# Patient Record
Sex: FEMALE | Race: AMERICAN INDIAN OR ALASKA NATIVE | URBAN - METROPOLITAN AREA
[De-identification: names, ages, dates, MRNs, and addresses within clinical notes are randomized per-mention and may not be internally consistent; named-entity substitution may affect disease eponyms.]

---

## 2017-10-03 ENCOUNTER — ALLIED HEALTH/NURSE VISIT (OUTPATIENT)
Dept: NEUROLOGY | Facility: CLINIC | Age: 22
End: 2017-10-03

## 2017-10-03 ENCOUNTER — OFFICE VISIT (OUTPATIENT)
Dept: NEUROLOGY | Facility: CLINIC | Age: 22
End: 2017-10-03

## 2017-10-03 ENCOUNTER — HOSPITAL ENCOUNTER (OUTPATIENT)
Facility: CLINIC | Age: 22
End: 2017-10-03
Attending: PSYCHIATRY & NEUROLOGY | Admitting: PSYCHIATRY & NEUROLOGY
Payer: MEDICARE

## 2017-10-03 VITALS — HEIGHT: 64 IN | BODY MASS INDEX: 17.31 KG/M2 | WEIGHT: 101.4 LBS

## 2017-10-03 DIAGNOSIS — G40.909 SEIZURE DISORDER (H): Primary | ICD-10-CM

## 2017-10-03 DIAGNOSIS — G40.812 LENNOX-GASTAUT SYNDROME WITH TONIC SEIZURES (H): Primary | ICD-10-CM

## 2017-10-03 NOTE — MR AVS SNAPSHOT
After Visit Summary   10/3/2017    Marilyn Cyr    MRN: [id number]           Patient Information     Date Of Birth          1995        Visit Information        Provider Department      10/3/2017 9:30 AM [facility]        Today's Diagnoses     Seizure disorder (H)    -  1       Follow-ups after your visit        Your next 10 appointments already scheduled     Oct 03, 2017  1:30 PM CDT   New Patient Visit with Ryland López MD   [facility] ([facility])    [address], [address]  [facility] [id number] [phone]              Who to contact     Please call your clinic at [phone] to:    Ask questions about your health    Make or cancel appointments    Discuss your medicines    Learn about your test results    Speak to your doctor   If you have compliments or concerns about an experience at your clinic, or if you wish to file a complaint, please contact [facility] Patient Relations at [phone] or email us at [email or web address]         Additional Information About Your Visit        MyChart Information     [employer] is an electronic gateway that provides easy, online access to your medical records. With [employer], you can request a clinic appointment, read your test results, renew a prescription or communicate with your care team.     To sign up for [employer] visit the website at [email or web address]   You will be asked to enter the access code listed below, as well as some personal information. Please follow the directions to create your username and password.     Your access code is: [email or web address]  Expires: 2018 12:31 PM     Your access code will  in 90 days. If you need help or a new code, please contact your [facility] or call [phone] for assistance.        Care EveryWhere ID     This is your Care EveryWhere ID. This could be used  by other organizations to access your [place] medical records  [id number]         Blood Pressure from Last 3 Encounters:   No data found for BP    Weight from Last 3 Encounters:   No data found for Wt              We Performed the Following     CHARGE: Video EEG  < 12 hours (49734-67)     ORDER:  EEG video monitoring        Primary Care Provider Office Phone # Fax #    Philippe Moon [phone]       [address] [address]        Equal Access to Services     WALKER SIBLEY : Hadii aad ku hadasho Soomaali, waaxda luqadaha, qaybta kaalmada adeegyada, waxay idiin hayaan adeeg kharajaden laharsh . So [facility] [phone].    ATENCIÓN: Si habla español, tiene a ingram disposición servicios gratuitos de asistencia lingüística. Llame al [phone].    We comply with applicable federal civil rights laws and [place] laws. We do not discriminate on the basis of race, color, national origin, age, disability, sex, sexual orientation, or gender identity.            Thank you!     Thank you for choosing [facility]  for your care. Our goal is always to provide you with excellent care. Hearing back from our patients is one way we can continue to improve our services. Please take a few minutes to complete the written survey that you may receive in the mail after your visit with us. Thank you!             Your Updated Medication List - Protect others around you: Learn how to safely use, store and throw away your medicines at www.disposemymeds.org.      Notice  As of 10/3/2017  1:04 PM    You have not been prescribed any medications.

## 2017-10-03 NOTE — MR AVS SNAPSHOT
After Visit Summary   10/3/2017    Marilyn Cyr    MRN: [id number]           Patient Information     Date Of Birth          1995        Visit Information        Provider Department      10/3/2017 1:30 PM Ryland López MD [facility]        Today's Diagnoses     Lennox-Gastaut syndrome with tonic seizures (H)    -  1       Follow-ups after your visit        Your next 10 appointments already scheduled     Oct 30, 2017  4:00 PM CDT   Telephone Call with [facility] Nurse 2   [facility] ([facility])    [address], [address]  [facility] [id number] [phone]           Note: this is not an onsite visit; there is no need to come to the facility.            Nov 01, 2017 10:00 AM CDT   (Arrive by 9:15 AM)   Video Hookup Visit with [facility] EEG TECH 4   [facility] EEG ([facility])    [facility]  [address] [facility] [address] [phone]           [place]: Your appointment is scheduled at [facility]. [address] [place] [address]              Who to contact     Please call your clinic at [phone] to:    Ask questions about your health    Make or cancel appointments    Discuss your medicines    Learn about your test results    Speak to your doctor   If you have compliments or concerns about an experience at your clinic, or if you wish to file a complaint, please contact [facility] Patient Relations at [phone] or email us at [email or web address]         Additional Information About Your Visit        ESKYhart Information     "[employer]" is an electronic gateway that provides easy, online access to your medical records. With "[employer]", you can request a clinic appointment, read your test results, renew a prescription or communicate with your care team.     To sign up for "[employer]" visit the website at  "[email or web address]   You will be asked to enter the access code listed below, as well as some personal information. Please follow the directions to create your username and password.     Your access code is: [email or web address]  Expires: 2018 12:31 PM     Your access code will  in 90 days. If you need help or a new code, please contact your [facility] or call [phone] for assistance.        Care EveryWhere ID     This is your Care EveryWhere ID. This could be used by other organizations to access your [place] medical records  [id number]        Your Vitals Were     Height BMI (Body Mass Index)                5' 4\" (162.6 cm) 17.41 kg/m2           Blood Pressure from Last 3 Encounters:   No data found for BP    Weight from Last 3 Encounters:   10/03/17 101 lb 6.4 oz (46 kg)              We Performed the Following     Admit to Inpatient        Primary Care Provider Office Phone # Fax #    Philippe Moon [phone]       [address] [address]        Equal Access to Services     [facility]: Hadii aad ku hadasho Soomaali, waaxda luqadaha, qaybta kaalmada adeegyada, waxay idiin hayaan adejaqueline greenfield . So [facility] [phone].    ATENCIÓN: Si habla español, tiene a ingram disposición servicios gratuitos de asistencia lingüística. Llame al [phone].    We comply with applicable federal civil rights laws and [place] laws. We do not discriminate on the basis of race, color, national origin, age, disability, sex, sexual orientation, or gender identity.            Thank you!     Thank you for choosing [facility] EPILEPSY [facility]  for your care. Our goal is always to provide you with excellent care. Hearing back from our patients is one way we can continue to improve our services. Please take a few minutes to complete the written survey that you may receive in the mail after your visit with us. Thank you!           "   Your Updated Medication List - Protect others around you: Learn how to safely use, store and throw away your medicines at www.disposemymeds.org.          This list is accurate as of: 10/3/17  2:46 PM.  Always use your most recent med list.                   Brand Name Dispense Instructions for use Diagnosis    LAMOTRIGINE PO      Take 150 mg by mouth 2 times daily

## 2017-10-03 NOTE — LETTER
10/3/2017       RE: Marilyn Cyr  : 1995   MRN: [id number]      Dear Colleague,    Thank you for referring your patient, Marilyn Cyr, to the [facility]LE[facility]ARE at [facility]. Please see a copy of my visit note below.    PATIENT INFORMATION:  A 21-year-old ambidextrous woman presents for further treatment of her medically intractable seizures.      SEIZURE HISTORY:  Seizure onset occurred at age 1.  Initially seizures were described as staring seizures.  Then she started experiencing periods of stiffening, sometimes with falls.  Eventually convulsions developed as well.  There were occasional myoclonic seizures early in her childhood.  There were never any atonic seizures.  Most recently she has had only tonic seizures and tonic-clonic seizures have improved; possibly because of more aggressive AED treatment.      She has had developmental delay, essentially since birth.  She never did learn how to speak.  Her walking actually has gotten better over the years.  She does have periods of hyperventilation and drools somewhat, however, her hand function has remained unchanged and mom denies progressive dysfphage.  She does fall on occasion, but the falls are associated with seizures.      At present, mother describes 2 seizure types.   Type #1, tonic seizures.  Mother describes flexion of her arms across her chest with her head turned slightly to the left.  She stiffens and drools.  Duration about 30 seconds.  Afterwards she is tired, smacks her lips and there is further reddening of her face.  She will uniformly fall with her seizures if she is standing.  Tonic seizures occur on a daily basis.       Type #2, tonic seizures leading to tonic-clonic seizures.  Seizures as above then lead to more profound stiffening and jerking.  Tongue bite or urinary incontinence does not occur.  These occur about 3-4 times per year and the last was 2 weeks ago.    She has sustained bumps  and bruises with seizure related falls but no fractures, lacerations, ER visits or more severe injuries.     RISK FACTORS FOR EPILEPSY:  Mother reports possible cardiac decelerations when she was pregnant with Marilyn.  These occurred late in her pregnancy.  She was born 3-4 weeks premature by natural birth.  She appeared normal at birth and went home on time.  However, developmental delay was quickly noted.  She was delayed with sitting, significantly delayed with her walking and never learned how to talk.  She has had only special education in school.  Mom denies febrile convulsions.  She denies meningitis, encephalitis, strokes, tumors, significant head trauma, street drug or alcohol use.  Mom reports significant delay in childhood and the gradual progression into her current state.  Mom denies regression in language, walking ability or hand function.      PREVIOUS EVALUATIONS FOR DEVELOPMENTAL DELAY AND EPILEPSY:  Mother brings records with her indicating that BAEP was normal.  Rett syndrome was suspected at [place], but MECP-2 testing was negative.  Chromosomal analysis was also negative.  Mom reports that EEG and MRI done at [facility] in 1999 were normal.  Mom brings reports from [place], which includes references to an MRI of the brain and EEG done at [place] in 03/2005, but we do not have those reports.  Secondhand references in the material brought indicate that CT scan showed cerebral atrophy.  Again, no reports original images are available.      CURRENT MEDICATIONS:   1.  Lamotrigine (100 mg) 200 mg in the morning, 150 mg in the evening; total 350 mg/d, approx 7 mg/kg/d.     She has been treated with as much as 400 mg of lamotrigine in the past.  An anticonvulsant level obtained 11/16/2015 was 10.6.  She was having seizures at that time.  Mom states she has not been treated with other anticonvulsant medications.      PAST MEDICAL HISTORY:   1.  Reportedly allergic to morphine.  Mother reports paradoxical  agitation with this medication was used.   2.  Mom denies hypertension, diabetes, lung, liver, kidney or heart disease.  Mom denies diagnosis of rheumatoid arthritis, lupus erythematosus or other autoimmune diseases.      FAMILY HISTORY:  No family history of seizures or developmental delay.  A sister had migraines.  No hip fractures in parents.      PSYCHOSOCIAL HISTORY:  Born and raised in [place].  She is a .  Mom denies sexual abuse or severe childhood traumas.  [place]  She has been followed neurologically by Catarino Leyva at [place].        She is currently full care.  She can walk independently.  She requires diapers.  She can help feed herself and help with clothes.  Mom reports some self-injurious behavior consisting mostly of hitting her head with her hands.  There has been no head banging.      REVIEW OF SYSTEMS:  According to mom, she appears to hear and see.  There is no dysphagia.  She experiences upper airway congestion with colds, but no cough, wheezing, sputum or hemoptysis.  No leg swelling.  No indication of abdominal pain, change in bowel habits, blood per rectum, nausea or vomiting.  Her appetite is good and there has been no weight change.  Mom denies dysuria, hematuria, flank pain or kidney stones.  There have been no fractures.  No significant traumas with falls suffered from her seizures.  Menarche at age 14, regular cycles.      PHYSICAL EXAMINATION:   Height 5 feet 4 inches.  Weight 101 pounds.  BMI is 17.  Heart rate is 72 and regular.  Head circumference is 50 cm.  There is no heart murmur.  She has a rash over her face and cheeks that may have the characteristics of a malar rash.  A papular rash is also seen over her posterior forearm on the extensor surfaces.  There is no blistering.  There is no macular rash in any part of her body including her chest, abdomen, back or legs below the knees.       She ambulates with a somewhat  disorganized gait but is safe on her own.  Tone appears normal.  Response to visual threat bilaterally.  Follows the examiner.  Eyes are conjugate.  Pupils are equal and reactive.  Smile is symmetrical.  She will not cooperate for the remainder of cranial nerve examination.  Moves all 4 limbs and resists examiner equally with all limbs.  Reflexes are active at arms, knees and ankles, and there is no clonus.  Tone appears normal.  She reaches for objects without obvious ataxia and spends much of the evaluation methodically tearing apart a piece of paper.     When excited, she will squeel and flap her arms.  She resists attempts to the examiner to examine her at times, but when examiner is gentle and soothing, she allows accommodation.  She does not speak.  She does not follow commands.  She does not repeat.      IMPRESSION:   1.  Probable tonic seizures, probable tonic-clonic seizures.  May have had atypical absence and myoclonic seizures in the past.  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  Continues with significant seizure burden with fairly frequent falls, although she has not sustained significant traumas.   2.  Refractory to lamotrigine.  She has achieved therapeutic levels.  Mother reports toxicity with higher doses. Per mom this is the only AED used thus far. Multiple treatment options for LGS remain. However, treatment with zonisamide, topiramate or felbatol would likely cause further anorexia.  She is already underweight, though mother reports that appetite is excellent.   3.  Severe developmental delay that is stable and chronic.  Microcephaly supports early insult. Mom does not provide a history of developmental regression nor are there other clinical features that raise concern about Rett syndrome.   4.  Malar rash.  This raises concerns about above lupus erythematosus.  If lupus this present, felbatol would be contraindicated.        DISCUSSION:   Mom is seeking a better seizure control.  She feels that Marilyn will get hurt if she continues having falls.  She also feels that the frequent seizures decrease the amount of times that she is interactive and overall decreases her quality of life.  Her goals are for patient to be seizure-free and to have less falls.      PLAN:   1.  Will attempt to obtain at least reports, preferably also images of MRI done at [facility] in .   2.  Admit for video EEG monitoring.  Our goal is to record multiple examples of the seizure mother reports every day.  No real need to record her convulsive seizures.     3.  Lab was closed today.  Comprehensive metabolic profile, CBC, ALEXANDR, anti-double stranded DNA and lamotrigine level should be obtained upon admission.   4.  If recordings support a clinical impression of Lennox-Gastaut syndrome, would add rufinamide to lamotrigine next.  Failing this, clobazam or oxcarbazepine could be considered.  Again, treatment with felbatol, zonisamide and topiramate could further decrease her weight and would use felbatol with caution until lupus is excluded.   5.  If she fails treatment with the 3 appropriate anticonvulsant medications, VNS or perhaps even corpus callosum section could be considered.  She should have an up-to-date MRI if either of these 2 are contemplated.  Corpus callosum section could perhaps be done with Gamma Knife.  6. Consider helmet depending on inpatient VEEG results.        CAIT KINCAID MD             D: 10/03/2017 21:19   T: 10/04/2017 10:31   MT: HIRA      Name:     MARILYN GILL   MRN:      [id number]        Account:      [id number]   :      1995           Service Date: 10/03/2017      Document: [id number]

## 2017-10-03 NOTE — PROGRESS NOTES
[facility] [id number]  OP/3hr Video EEG  [facility]  Dr. Maribel gutierrez

## 2017-10-04 NOTE — PROGRESS NOTES
PATIENT INFORMATION:  A 21-year-old ambidextrous woman presents for further treatment of her medically intractable seizures.      SEIZURE HISTORY:  Seizure onset occurred at age 1.  Initially seizures were described as staring seizures.  Then she started experiencing periods of stiffening, sometimes with falls.  Eventually convulsions developed as well.  There were occasional myoclonic seizures early in her childhood.  There were never any atonic seizures.  Most recently she has had only tonic seizures and tonic-clonic seizures have improved; possibly because of more aggressive AED treatment.      She has had developmental delay, essentially since birth.  She never did learn how to speak.  Her walking actually has gotten better over the years.  She does have periods of hyperventilation and drools somewhat, however, her hand function has remained unchanged and mom denies progressive dysfphage.  She does fall on occasion, but the falls are associated with seizures.      At present, mother describes 2 seizure types.   Type #1, tonic seizures.  Mother describes flexion of her arms across her chest with her head turned slightly to the left.  She stiffens and drools.  Duration about 30 seconds.  Afterwards she is tired, smacks her lips and there is further reddening of her face.  She will uniformly fall with her seizures if she is standing.  Tonic seizures occur on a daily basis.       Type #2, tonic seizures leading to tonic-clonic seizures.  Seizures as above then lead to more profound stiffening and jerking.  Tongue bite or urinary incontinence does not occur.  These occur about 3-4 times per year and the last was 2 weeks ago.    She has sustained bumps and bruises with seizure related falls but no fractures, lacerations, ER visits or more severe injuries.     RISK FACTORS FOR EPILEPSY:  Mother reports possible cardiac decelerations when she was pregnant with Marilyn.  These occurred late in her pregnancy.  She was born  3-4 weeks premature by natural birth.  She appeared normal at birth and went home on time.  However, developmental delay was quickly noted.  She was delayed with sitting, significantly delayed with her walking and never learned how to talk.  She has had only special education in school.  Mom denies febrile convulsions.  She denies meningitis, encephalitis, strokes, tumors, significant head trauma, street drug or alcohol use.  Mom reports significant delay in childhood and the gradual progression into her current state.  Mom denies regression in language, walking ability or hand function.      PREVIOUS EVALUATIONS FOR DEVELOPMENTAL DELAY AND EPILEPSY:  Mother brings records with her indicating that BAEP was normal.  [facility], but MECP-2 testing was negative.  Chromosomal analysis was also negative.  Mom reports that EEG and MRI done at [facility] in 1999 were normal.  Mom brings reports from [facility], which includes references to an MRI of the brain and EEG done at [facility] in 03/2005, but we do not have those reports.  Secondhand references in the material brought indicate that CT scan showed cerebral atrophy.  Again, no reports original images are available.      CURRENT MEDICATIONS:   1.  Lamotrigine (100 mg) 200 mg in the morning, 150 mg in the evening; total 350 mg/d, approx 7 mg/kg/d.     She has been treated with as much as 400 mg of lamotrigine in the past.  An anticonvulsant level obtained 11/16/2015 was 10.6.  She was having seizures at that time.  Mom states she has not been treated with other anticonvulsant medications.      PAST MEDICAL HISTORY:   1.  Reportedly allergic to morphine.  Mother reports paradoxical agitation with this medication was used.   2.  Mom denies hypertension, diabetes, lung, liver, kidney or heart disease.  Mom denies diagnosis of rheumatoid arthritis, lupus erythematosus or other autoimmune diseases.      FAMILY HISTORY:  No family history of seizures  or developmental delay.  A sister had migraines.  No hip fractures in parents.      PSYCHOSOCIAL HISTORY:  Born and raised in [place].  She is a .  Mom denies sexual abuse or severe childhood traumas.  [place]  She has been followed neurologically by Catarino Leyva at [place].        She is currently full care.  She can walk independently.  She requires diapers.  She can help feed herself and help with clothes.  Mom reports some self-injurious behavior consisting mostly of hitting her head with her hands.  There has been no head banging.      REVIEW OF SYSTEMS:  According to mom, she appears to hear and see.  There is no dysphagia.  She experiences upper airway congestion with colds, but no cough, wheezing, sputum or hemoptysis.  No leg swelling.  No indication of abdominal pain, change in bowel habits, blood per rectum, nausea or vomiting.  Her appetite is good and there has been no weight change.  Mom denies dysuria, hematuria, flank pain or kidney stones.  There have been no fractures.  No significant traumas with falls suffered from her seizures.  Menarche at age 14, regular cycles.      PHYSICAL EXAMINATION:   Height 5 feet 4 inches.  Weight 101 pounds.  BMI is 17.  Heart rate is 72 and regular.  Head circumference is 50 cm.  There is no heart murmur.  She has a rash over her face and cheeks that may have the characteristics of a malar rash.  A papular rash is also seen over her posterior forearm on the extensor surfaces.  There is no blistering.  There is no macular rash in any part of her body including her chest, abdomen, back or legs below the knees.       She ambulates with a somewhat disorganized gait but is safe on her own.  Tone appears normal.  Response to visual threat bilaterally.  Follows the examiner.  Eyes are conjugate.  Pupils are equal and reactive.  Smile is symmetrical.  She will not cooperate for the remainder of cranial nerve  examination.  Moves all 4 limbs and resists examiner equally with all limbs.  Reflexes are active at arms, knees and ankles, and there is no clonus.  Tone appears normal.  She reaches for objects without obvious ataxia and spends much of the evaluation methodically tearing apart a piece of paper.     When excited, she will squeel and flap her arms.  She resists attempts to the examiner to examine her at times, but when examiner is gentle and soothing, she allows accommodation.  She does not speak.  She does not follow commands.  She does not repeat.      IMPRESSION:   1.  Probable tonic seizures, probable tonic-clonic seizures.  May have had atypical absence and myoclonic seizures in the past.  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  Continues with significant seizure burden with fairly frequent falls, although she has not sustained significant traumas.   2.  Refractory to lamotrigine.  She has achieved therapeutic levels.  Mother reports toxicity with higher doses. Per mom this is the only AED used thus far. Multiple treatment options for LGS remain. However, treatment with zonisamide, topiramate or felbatol would likely cause further anorexia.  She is already underweight, though mother reports that appetite is excellent.   3.  Severe developmental delay that is stable and chronic.  Microcephaly supports early insult. Mom does not provide a history of developmental regression nor are there other clinical features that raise concern about Rett syndrome.   4.  Malar rash.  This raises concerns about above lupus erythematosus.  If lupus this present, felbatol would be contraindicated.        DISCUSSION:  Mom is seeking a better seizure control.  She feels that Marilyn will get hurt if she continues having falls.  She also feels that the frequent seizures decrease the amount of times that she is interactive and overall decreases her quality of life.  Her goals are  for patient to be seizure-free and to have less falls.      PLAN:   1.  Will attempt to obtain at least reports, preferably also images of MRI done at [facility] in .   2.  Admit for video EEG monitoring.  Our goal is to record multiple examples of the seizure mother reports every day.  No real need to record her convulsive seizures.     3.  Lab was closed today.  Comprehensive metabolic profile, CBC, ALEXANDR, anti-double stranded DNA and lamotrigine level should be obtained upon admission.   4.  If recordings support a clinical impression of Lennox-Gastaut syndrome, would add rufinamide to lamotrigine next.  Failing this, clobazam or oxcarbazepine could be considered.  Again, treatment with felbatol, zonisamide and topiramate could further decrease her weight and would use felbatol with caution until lupus is excluded.   5.  If she fails treatment with the 3 appropriate anticonvulsant medications, VNS or perhaps even corpus callosum section could be considered.  She should have an up-to-date MRI if either of these 2 are contemplated.  Corpus callosum section could perhaps be done with Gamma Knife.  6. Consider helmet depending on inpatient VEEG results.        CAIT KINCAID MD             D: 10/03/2017 21:19   T: 10/04/2017 10:31   MT: HIRA      Name:     JOHN GILL   MRN:      [id number]        Account:      [id number]   :      1995           Service Date: 10/03/2017      Document: [id number]

## 2017-10-04 NOTE — PROCEDURES
VIDEO EEG #:  [id number]      DATE OF STUDY:  10/03/2017      TYPE OF STUDY:  Outpatient video EEG monitoring.        DURATION OF RECORDIN hours 52 minutes 20 seconds      HISTORY:  A 21 year old being evaluated for her medically intractable epilepsy.  She is being treated with lamotrigine and has used up to 300 mg per day.  Seizures are medically intractable, and epilepsy surgery, perhaps VNS, is being considered.    TECHNICAL SUMMARY: This continuous video- EEG monitoring procedure was performed with 23 scalp electrodes in 10-20 electrode system placements, and additional scalp, precordial and other surface electrodes used for electrical referencing and artifact detection.  Video monitoring was utilized and periodically reviewed by EEG technologists and the physician for electroclinical correlations.     FINDINGS:  Desynchronized background prevails during much of the study.  The patient is clearly awake with eyeblinks.  Irregular theta and some delta are seen in the central regions.  There is no posterior dominant rhythm.      Photic stimulation adds no information.  Hyperventilation is not performed.      INTERICTAL ABNORMALITIES:  No epileptiform discharges.      ICTAL ABNORMALITIES:  The patient engages in rocking behaviors during much of the study.  These are not accompanied by EEG changes.  The patient experiences a spell that has the clinical appearance of a tonic seizure early in the study.  Unfortunately, impedance check is being performed during the spell, so neither EEG nor video is recorded.  The technician describes head turning to the left, extension of both arms and some stiffening of the upper body.  Video and EEG are interrupted at 10:30:06 and resume at 10:34:32.  At this point, the patient is hyperventilating and rocking.  The EEG when the video EEG resumes shows irregular diffuse delta, which is much more persistent than seen at other times.  This disappears by around 10:35:30.         IMPRESSION:  Abnormal.  There is significant attenuation of ongoing activity indicating significant diffuse cerebral dysfunction.  Epileptiform activity is not seen.      Technicians report a spell.  Their description of the spell suggests that the spell was a tonic seizure.  Unfortunately, neither EEG or video is available because impedance check was occurring during the spell, and recording was interrupted.  Seizures were not seen at other times.  See body of report for details.         CAIT KINCAID MD             D: 10/03/2017 13:53   T: 10/03/2017 14:40   MT: walter      Name:     JOHN GILL   MRN:      [id number]        Account:        [id number]   :      1995           Procedure Date: 10/03/2017      Document: [id number]

## 2017-10-30 ENCOUNTER — VIRTUAL VISIT (OUTPATIENT)
Dept: NEUROLOGY | Facility: CLINIC | Age: 22
End: 2017-10-30

## 2017-10-30 DIAGNOSIS — G40.812 LENNOX-GASTAUT SYNDROME WITH TONIC SEIZURES (H): Primary | ICD-10-CM

## 2017-10-30 NOTE — PROGRESS NOTES
Confirmed admission to [facility] on 11/1/17 to arrive by 9:15 AM.  No further action needed.

## 2017-10-30 NOTE — MR AVS SNAPSHOT
After Visit Summary   10/30/2017    Marilyn Cyr    MRN: [id number]           Patient Information     Date Of Birth          1995        Visit Information        Provider Department      10/30/2017 4:00 PM 2, [facility] Nurse [facility]        Today's Diagnoses     Lennox-Gastaut syndrome with tonic seizures (H)    -  1       Follow-ups after your visit        Your next 10 appointments already scheduled     Oct 30, 2017  4:00 PM CDT   Telephone Call with [facility] Nurse 2   [facility] ([facility])    [address], [address]  [facility] [id number] [phone]           Note: this is not an onsite visit; there is no need to come to the facility.            Nov 01, 2017 10:00 AM CDT   (Arrive by 9:15 AM)   Video Hookup Visit with [facility] EEG TECH 4   [facility] EEG ([facility])    [facility]  [address] [id number] [phone]           [place]: Your appointment is scheduled at [facility]. [address] [place] [address]              Who to contact     Please call your clinic at [phone] to:    Ask questions about your health    Make or cancel appointments    Discuss your medicines    Learn about your test results    Speak to your doctor   If you have compliments or concerns about an experience at your clinic, or if you wish to file a complaint, please contact [facility] Patient Relations at [phone] or email us at [email or web address]         Additional Information About Your Visit        MyChart Information     [employer] is an electronic gateway that provides easy, online access to your medical records. With [employer], you can request a clinic appointment, read your test results, renew a prescription or communicate with your care team.     To sign up for [employer] visit the website at [email or web address]   You  will be asked to enter the access code listed below, as well as some personal information. Please follow the directions to create your username and password.     Your access code is: [email or web address]  Expires: 2018 12:31 PM     Your access code will  in 90 days. If you need help or a new code, please contact your [facility] or call [phone] for assistance.        Care EveryWhere ID     This is your Care EveryWhere ID. This could be used by other organizations to access your [place] medical records  [id number]         Blood Pressure from Last 3 Encounters:   No data found for BP    Weight from Last 3 Encounters:   10/03/17 101 lb 6.4 oz (46 kg)              Today, you had the following     No orders found for display       Primary Care Provider Office Phone # Fax #    Philippe Moon [phone]       [address] [address]        Equal Access to Services     [facility]: Hadii aad ku hadasho Soomaali, waaxda luqadaha, qaybta kaalmada adeegyada, waxay idiin hayaan adeeg kharajaden la'clifford . So [facility] [phone].    ATENCIÓN: Si habla español, tiene a ingram disposición servicios gratuitos de asistencia lingüística. Llame al [phone].    We comply with applicable federal civil rights laws and [place] laws. We do not discriminate on the basis of race, color, national origin, age, disability, sex, sexual orientation, or gender identity.            Thank you!     Thank you for choosing [facility] EPILEPSY [facility]  for your care. Our goal is always to provide you with excellent care. Hearing back from our patients is one way we can continue to improve our services. Please take a few minutes to complete the written survey that you may receive in the mail after your visit with us. Thank you!             Your Updated Medication List - Protect others around you: Learn how to safely use, store and throw away your medicines at  www.disposemymeds.org.          This list is accurate as of: 10/30/17  1:30 PM.  Always use your most recent med list.                   Brand Name Dispense Instructions for use Diagnosis    LAMOTRIGINE PO      Take 150 mg by mouth 2 times daily

## 2017-12-04 ENCOUNTER — TRANSFERRED RECORDS (OUTPATIENT)
Dept: HEALTH INFORMATION MANAGEMENT | Facility: CLINIC | Age: 22
End: 2017-12-04

## 2017-12-08 ENCOUNTER — VIRTUAL VISIT (OUTPATIENT)
Dept: NEUROLOGY | Facility: CLINIC | Age: 22
End: 2017-12-08

## 2017-12-08 DIAGNOSIS — G40.812 LENNOX-GASTAUT SYNDROME WITH TONIC SEIZURES (H): Primary | ICD-10-CM

## 2017-12-08 NOTE — MR AVS SNAPSHOT
After Visit Summary   2017    Marilyn Cyr    MRN: [id number]           Patient Information     Date Of Birth          1995        Visit Information        Provider Department      2017 11:30 AM Neymar Byrne [facility] Nurse [facility]         Follow-ups after your visit        Your next 10 appointments already scheduled     Dec 12, 2017 10:00 AM CST   (Arrive by 9:15 AM)   Video Hookup Visit with [facility] EEG TECH 4   [facility] EEG ([facility])    [facility]  [address] [address] [phone]           [place]: Your appointment is scheduled at [facility]. [address] [place] [address]              Who to contact     Please call your clinic at [phone] to:    Ask questions about your health    Make or cancel appointments    Discuss your medicines    Learn about your test results    Speak to your doctor   If you have compliments or concerns about an experience at your clinic, or if you wish to file a complaint, please contact [facility] Patient Relations at [phone] or email us at [email or web address]         Additional Information About Your Visit        MyChart Information     [employer] is an electronic gateway that provides easy, online access to your medical records. With [employer], you can request a clinic appointment, read your test results, renew a prescription or communicate with your care team.     To sign up for [employer] visit the website at [email or web address]   You will be asked to enter the access code listed below, as well as some personal information. Please follow the directions to create your username and password.     Your access code is: [email or web address]  Expires: 2018 11:31 AM     Your access code will  in 90 days. If you need help or a new code, please contact your [facility] or call  [phone] for assistance.        Care EveryWhere ID     This is your Care EveryWhere ID. This could be used by other organizations to access your [place] medical records  [id number]         Blood Pressure from Last 3 Encounters:   No data found for BP    Weight from Last 3 Encounters:   10/03/17 101 lb 6.4 oz (46 kg)              Today, you had the following     No orders found for display       Primary Care Provider Office Phone # Fax #    Philippe Moon [phone]       [facility] [address] [facility] [address]        Equal Access to Services     [facility]: Hadii aad ku hadasho Soomaali, waaxda luqadaha, qaybta kaalmada adeegyada, leslee zhang hayclifford greenfield . So [facility] [phone].    ATENCIÓN: Si habla español, tiene a ingram disposición servicios gratuitos de asistencia lingüística. [facility] [phone].    We comply with applicable federal civil rights laws and [place] laws. We do not discriminate on the basis of race, color, national origin, age, disability, sex, sexual orientation, or gender identity.            Thank you!     Thank you for choosing [facility] EPILEPSY [facility]  for your care. Our goal is always to provide you with excellent care. Hearing back from our patients is one way we can continue to improve our services. Please take a few minutes to complete the written survey that you may receive in the mail after your visit with us. Thank you!             Your Updated Medication List - Protect others around you: Learn how to safely use, store and throw away your medicines at www.disposemymeds.org.          This list is accurate as of: 12/8/17  4:41 PM.  Always use your most recent med list.                   Brand Name Dispense Instructions for use Diagnosis    LAMOTRIGINE PO      Take 150 mg by mouth 2 times daily

## 2017-12-08 NOTE — PROGRESS NOTES
Reminder / confirmation call for admission to [facility]  No answer, message left to call back

## 2017-12-12 ENCOUNTER — HOSPITAL ENCOUNTER (INPATIENT)
Facility: CLINIC | Age: 22
LOS: 6 days | Discharge: NURSING, REHAB OR LONG-TERM CARE | DRG: 101 | End: 2017-12-18
Attending: PSYCHIATRY & NEUROLOGY | Admitting: PSYCHIATRY & NEUROLOGY
Payer: MEDICARE

## 2017-12-12 ENCOUNTER — ALLIED HEALTH/NURSE VISIT (OUTPATIENT)
Dept: NEUROLOGY | Facility: CLINIC | Age: 22
DRG: 101 | End: 2017-12-12
Attending: PSYCHIATRY & NEUROLOGY
Payer: MEDICARE

## 2017-12-12 DIAGNOSIS — G40.812 LENNOX-GASTAUT SYNDROME WITH TONIC SEIZURES (H): Primary | ICD-10-CM

## 2017-12-12 DIAGNOSIS — G40.909 NONINTRACTABLE EPILEPSY WITHOUT STATUS EPILEPTICUS, UNSPECIFIED EPILEPSY TYPE (H): ICD-10-CM

## 2017-12-12 DIAGNOSIS — R21 MALAR RASH: ICD-10-CM

## 2017-12-12 DIAGNOSIS — G47.00 INSOMNIA, UNSPECIFIED TYPE: Primary | ICD-10-CM

## 2017-12-12 DIAGNOSIS — G40.909 SEIZURE DISORDER (H): ICD-10-CM

## 2017-12-12 LAB
ALBUMIN SERPL-MCNC: 3.9 G/DL (ref 3.4–5)
ALP SERPL-CCNC: 75 U/L (ref 40–150)
ALT SERPL W P-5'-P-CCNC: 32 U/L (ref 0–50)
ANION GAP SERPL CALCULATED.3IONS-SCNC: 8 MMOL/L (ref 3–14)
AST SERPL W P-5'-P-CCNC: 20 U/L (ref 0–45)
B-HCG SERPL-ACNC: <1 IU/L (ref 0–5)
BILIRUB SERPL-MCNC: 0.6 MG/DL (ref 0.2–1.3)
BUN SERPL-MCNC: 11 MG/DL (ref 7–30)
CALCIUM SERPL-MCNC: 9.3 MG/DL (ref 8.5–10.1)
CHLORIDE SERPL-SCNC: 109 MMOL/L (ref 94–109)
CO2 SERPL-SCNC: 22 MMOL/L (ref 20–32)
CREAT SERPL-MCNC: 0.95 MG/DL (ref 0.52–1.04)
ERYTHROCYTE [DISTWIDTH] IN BLOOD BY AUTOMATED COUNT: 19.2 % (ref 10–15)
FERRITIN SERPL-MCNC: 30 NG/ML (ref 12–150)
GFR SERPL CREATININE-BSD FRML MDRD: 73 ML/MIN/1.7M2
GLUCOSE SERPL-MCNC: 74 MG/DL (ref 70–99)
HCT VFR BLD AUTO: 42.4 % (ref 35–47)
HGB BLD-MCNC: 13.3 G/DL (ref 11.7–15.7)
IRON SATN MFR SERPL: 30 % (ref 15–46)
IRON SERPL-MCNC: 85 UG/DL (ref 35–180)
LACTATE BLD-SCNC: 1.4 MMOL/L (ref 0.7–2)
MAGNESIUM SERPL-MCNC: 2.4 MG/DL (ref 1.6–2.3)
MCH RBC QN AUTO: 20.6 PG (ref 26.5–33)
MCHC RBC AUTO-ENTMCNC: 31.4 G/DL (ref 31.5–36.5)
MCV RBC AUTO: 66 FL (ref 78–100)
PHOSPHATE SERPL-MCNC: 3.6 MG/DL (ref 2.5–4.5)
PLATELET # BLD AUTO: 145 10E9/L (ref 150–450)
POTASSIUM SERPL-SCNC: 3.9 MMOL/L (ref 3.4–5.3)
PROT SERPL-MCNC: 7.8 G/DL (ref 6.8–8.8)
RBC # BLD AUTO: 6.46 10E12/L (ref 3.8–5.2)
SODIUM SERPL-SCNC: 140 MMOL/L (ref 133–144)
TIBC SERPL-MCNC: 283 UG/DL (ref 240–430)
WBC # BLD AUTO: 11.8 10E9/L (ref 4–11)

## 2017-12-12 PROCEDURE — A9270 NON-COVERED ITEM OR SERVICE: HCPCS | Mod: GY | Performed by: PSYCHIATRY & NEUROLOGY

## 2017-12-12 PROCEDURE — 86225 DNA ANTIBODY NATIVE: CPT | Performed by: PSYCHIATRY & NEUROLOGY

## 2017-12-12 PROCEDURE — 85027 COMPLETE CBC AUTOMATED: CPT | Performed by: PSYCHIATRY & NEUROLOGY

## 2017-12-12 PROCEDURE — 80175 DRUG SCREEN QUAN LAMOTRIGINE: CPT | Performed by: PSYCHIATRY & NEUROLOGY

## 2017-12-12 PROCEDURE — 12000025 ZZH R&B TRANSPLANT INTERMEDIATE

## 2017-12-12 PROCEDURE — 86039 ANTINUCLEAR ANTIBODIES (ANA): CPT | Performed by: PSYCHIATRY & NEUROLOGY

## 2017-12-12 PROCEDURE — 83735 ASSAY OF MAGNESIUM: CPT | Performed by: PSYCHIATRY & NEUROLOGY

## 2017-12-12 PROCEDURE — 36415 COLL VENOUS BLD VENIPUNCTURE: CPT | Performed by: PSYCHIATRY & NEUROLOGY

## 2017-12-12 PROCEDURE — 25000132 ZZH RX MED GY IP 250 OP 250 PS 637: Mod: GY | Performed by: PSYCHIATRY & NEUROLOGY

## 2017-12-12 PROCEDURE — 84702 CHORIONIC GONADOTROPIN TEST: CPT | Performed by: PSYCHIATRY & NEUROLOGY

## 2017-12-12 PROCEDURE — 83540 ASSAY OF IRON: CPT | Performed by: PSYCHIATRY & NEUROLOGY

## 2017-12-12 PROCEDURE — 84100 ASSAY OF PHOSPHORUS: CPT | Performed by: PSYCHIATRY & NEUROLOGY

## 2017-12-12 PROCEDURE — 40000141 ZZH STATISTIC PERIPHERAL IV START W/O US GUIDANCE

## 2017-12-12 PROCEDURE — 83605 ASSAY OF LACTIC ACID: CPT | Performed by: PSYCHIATRY & NEUROLOGY

## 2017-12-12 PROCEDURE — 82728 ASSAY OF FERRITIN: CPT | Performed by: PSYCHIATRY & NEUROLOGY

## 2017-12-12 PROCEDURE — 83550 IRON BINDING TEST: CPT | Performed by: PSYCHIATRY & NEUROLOGY

## 2017-12-12 PROCEDURE — 86038 ANTINUCLEAR ANTIBODIES: CPT | Performed by: PSYCHIATRY & NEUROLOGY

## 2017-12-12 PROCEDURE — 80053 COMPREHEN METABOLIC PANEL: CPT | Performed by: PSYCHIATRY & NEUROLOGY

## 2017-12-12 PROCEDURE — 95951 ZZHC EEG VIDEO EACH 24 HR: CPT | Mod: ZF

## 2017-12-12 RX ORDER — LANOLIN ALCOHOL/MO/W.PET/CERES
100 CREAM (GRAM) TOPICAL ONCE
Status: DISCONTINUED | OUTPATIENT
Start: 2017-12-12 | End: 2017-12-12

## 2017-12-12 RX ORDER — SENNOSIDES 8.6 MG
1-2 TABLET ORAL DAILY PRN
COMMUNITY

## 2017-12-12 RX ORDER — MAGNESIUM OXIDE 400 MG/1
800 TABLET ORAL ONCE
Status: COMPLETED | OUTPATIENT
Start: 2017-12-12 | End: 2017-12-12

## 2017-12-12 RX ORDER — LIDOCAINE 40 MG/G
CREAM TOPICAL
Status: DISCONTINUED | OUTPATIENT
Start: 2017-12-12 | End: 2017-12-18 | Stop reason: HOSPADM

## 2017-12-12 RX ORDER — FOLIC ACID 1 MG/1
1 TABLET ORAL DAILY
Status: DISCONTINUED | OUTPATIENT
Start: 2017-12-12 | End: 2017-12-18 | Stop reason: HOSPADM

## 2017-12-12 RX ORDER — ACETAMINOPHEN 325 MG/1
650 TABLET ORAL EVERY 4 HOURS PRN
Status: DISCONTINUED | OUTPATIENT
Start: 2017-12-12 | End: 2017-12-18 | Stop reason: HOSPADM

## 2017-12-12 RX ORDER — FOLIC ACID 1 MG/1
1 TABLET ORAL ONCE
Status: DISCONTINUED | OUTPATIENT
Start: 2017-12-12 | End: 2017-12-12

## 2017-12-12 RX ORDER — DOCUSATE SODIUM 100 MG/1
100 CAPSULE, LIQUID FILLED ORAL 2 TIMES DAILY PRN
Status: DISCONTINUED | OUTPATIENT
Start: 2017-12-12 | End: 2017-12-15

## 2017-12-12 RX ORDER — LAMOTRIGINE 150 MG/1
150 TABLET ORAL EVERY EVENING
Status: DISCONTINUED | OUTPATIENT
Start: 2017-12-12 | End: 2017-12-13

## 2017-12-12 RX ORDER — FERROUS SULFATE 7.5 MG/0.5
75 SYRINGE (EA) ORAL EVERY OTHER DAY
Status: DISCONTINUED | OUTPATIENT
Start: 2017-12-12 | End: 2017-12-12

## 2017-12-12 RX ORDER — LAMOTRIGINE 200 MG/1
200 TABLET ORAL EVERY MORNING
Status: DISCONTINUED | OUTPATIENT
Start: 2017-12-12 | End: 2017-12-13

## 2017-12-12 RX ORDER — MULTIVITAMIN,THERAPEUTIC
1 TABLET ORAL ONCE
Status: DISCONTINUED | OUTPATIENT
Start: 2017-12-12 | End: 2017-12-12

## 2017-12-12 RX ORDER — MULTIVITAMIN,THERAPEUTIC
1 TABLET ORAL DAILY
Status: DISCONTINUED | OUTPATIENT
Start: 2017-12-12 | End: 2017-12-18 | Stop reason: HOSPADM

## 2017-12-12 RX ORDER — LORAZEPAM 2 MG/ML
2 INJECTION INTRAMUSCULAR
Status: DISCONTINUED | OUTPATIENT
Start: 2017-12-12 | End: 2017-12-13

## 2017-12-12 RX ORDER — LANOLIN ALCOHOL/MO/W.PET/CERES
100 CREAM (GRAM) TOPICAL DAILY
Status: COMPLETED | OUTPATIENT
Start: 2017-12-12 | End: 2017-12-14

## 2017-12-12 RX ORDER — GINSENG 100 MG
CAPSULE ORAL 3 TIMES DAILY PRN
Status: DISCONTINUED | OUTPATIENT
Start: 2017-12-12 | End: 2017-12-18 | Stop reason: HOSPADM

## 2017-12-12 RX ADMIN — Medication 100 MG: at 17:19

## 2017-12-12 RX ADMIN — LAMOTRIGINE 200 MG: 100 TABLET ORAL at 13:25

## 2017-12-12 RX ADMIN — FOLIC ACID 1 MG: 1 TABLET ORAL at 17:20

## 2017-12-12 RX ADMIN — MAGNESIUM OXIDE TAB 400 MG (241.3 MG ELEMENTAL MG) 800 MG: 400 (241.3 MG) TAB at 17:19

## 2017-12-12 RX ADMIN — LAMOTRIGINE 150 MG: 150 TABLET ORAL at 20:25

## 2017-12-12 RX ADMIN — THERA TABS 1 TABLET: TAB at 17:19

## 2017-12-12 NOTE — MR AVS SNAPSHOT
After Visit Summary   2017    Marilyn Cyr    MRN: [id number]           Patient Information     Date Of Birth          1995        Visit Information        Provider Department      2017 10:00 AM [facility] EEG TECH 4 [facility] EEG        Today's Diagnoses     Lennox-Gastaut syndrome with tonic seizures (H)    -  1    Seizure disorder (H)           Follow-ups after your visit        Who to contact     Please call your clinic at [phone] to:    Ask questions about your health    Make or cancel appointments    Discuss your medicines    Learn about your test results    Speak to your doctor   If you have compliments or concerns about an experience at your clinic, or if you wish to file a complaint, please contact [facility] Patient Relations at [phone] or email us at [email or web address]         Additional Information About Your Visit        MyChart Information     [employer] is an electronic gateway that provides easy, online access to your medical records. With [employer], you can request a clinic appointment, read your test results, renew a prescription or communicate with your care team.     To sign up for Kindermintt visit the website at [email or web address]   You will be asked to enter the access code listed below, as well as some personal information. Please follow the directions to create your username and password.     Your access code is: [email or web address]  Expires: 2018 11:31 AM     Your access code will  in 90 days. If you need help or a new code, please contact your [facility] or call [phone] for assistance.        Care EveryWhere ID     This is your Care EveryWhere ID. This could be used by other organizations to access your [facility] medical records  [id number]         Blood Pressure from Last 3 Encounters:   17 128/88    Weight from Last 3 Encounters:   17 44.8 kg (98 lb 12.8 oz)    10/03/17 46 kg (101 lb 6.4 oz)              Today, you had the following     No orders found for display         Today's Medication Changes      Notice     This visit is during an admission. Changes to the med list made in this visit will be reflected in the After Visit Summary of the admission.             Primary Care Provider Office Phone # Fax #    Philippe Moon [phone]       [address] [address]        Equal Access to Services     WALKER SIBLEY : Hadii aad ku hadasho Soomaali, waaxda luqadaha, qaybta kaalmada adeegyada, waxay idiin hayaan adeeg cindy martínez. So [facility] [phone].    ATENCIÓN: Si habla español, tiene a ingram disposición servicios gratuitos de asistencia lingüística. [facility] [phone].    We comply with applicable federal civil rights laws and [place] laws. We do not discriminate on the basis of race, color, national origin, age, disability, sex, sexual orientation, or gender identity.            Thank you!     Thank you for choosing [facility]  for your care. Our goal is always to provide you with excellent care. Hearing back from our patients is one way we can continue to improve our services. Please take a few minutes to complete the written survey that you may receive in the mail after your visit with us. Thank you!             Your Updated Medication List - Protect others around you: Learn how to safely use, store and throw away your medicines at www.disposemymeds.org.      Notice     This visit is during an admission. Changes to the med list made in this visit will be reflected in the After Visit Summary of the admission.

## 2017-12-12 NOTE — IP AVS SNAPSHOT
[address] [address]    Phone:  [phone]                                       After Visit Summary   12/12/2017    Marilyn Cyr    MRN: [id number]           After Visit Summary Signature Page     I have received my discharge instructions, and my questions have been answered. I have discussed any challenges I see with this plan with the nurse or doctor.    ..........................................................................................................................................  Patient/Patient Representative Signature      ..........................................................................................................................................  Patient Representative Print Name and Relationship to Patient    ..................................................               ................................................  Date                                            Time    ..........................................................................................................................................  Reviewed by Signature/Title    ...................................................              ..............................................  Date                                                            Time

## 2017-12-12 NOTE — IP AVS SNAPSHOT
MRN:[id number]                      After Visit Summary   12/12/2017    Marilyn Cyr    MRN: [id number]           Thank you!     Thank you for choosing [facility] for your care. Our goal is always to provide you with excellent care. Hearing back from our patients is one way we can continue to improve our services. Please take a few minutes to complete the written survey that you may receive in the mail after you visit with us. Thank you!        Patient Information     Date Of Birth          1995        Designated Caregiver       Most Recent Value    Caregiver    Will someone help with your care after discharge? yes    Name of designated caregiver [employer]    Phone number of caregiver [phone]    Caregiver address [address]      About your hospital stay     You were admitted on:  December 12, 2017 You last received care in the:  [address] [facility]    You were discharged on:  December 18, 2017        Reason for your hospital stay       You were hospitalized to characterize your seizures. Your lamotrigine was weaned for seizure induction and you had 6 seizures during your hospital stay. You have a malar rash concerning for autoimmune sensitivity, Rheumatology examined you for SLE, which they do not suspect at this time. You were changed to oxcarbazepine to see if that will help your rash.                  Who to Call     For medical emergencies, please call 911.  For non-urgent questions about your medical care, please call your primary care provider or clinic, [phone]          Attending Provider     Provider Specialty    Ryland López MD Neurology    [facility] Neurology       Primary Care Provider Office Phone # Fax #    Philippe Moon [phone]       When to contact your care team       Contact [facility] at [phone] for questions regarding her epilepsy.                  After  Care Instructions     Activity       Your activity upon discharge: activity as tolerated  State laws prohibit operating a motor vehicle following any seizure or other episode with loss of awareness, or inability to sit up (Varies by state, be aware and comply with the regulations applicable to you). State law may require the licensed  to report any future episode to the DMV . Avoid any activities that might lead to self-injury or injury of others following any event with impaired awareness or impaired motor control. Such activities include but are not limited to holding babies or young children at heights from which they might be injured if dropped, bathing infants or young children in situations in which they might drown without continuous interactive care by an adult who is fully capable at all times during the bath, operating power cutting or other tools, handling firearms, exposure to heights from which you might fall, exposure to vessels with hot cooking oil or water, and swimming alone.            Diet       Resume your home diet.            Discharge Instructions       .Try to maintain a regular daily routine, with 8 hours of sleep, regular healthy meals, and routine activity/exercise.   Learn stress reduction techniques, such as controlled breathing exercises and meditation/mindfullness.   Avoid getting over tired, alcohol, and unprescribed drugs.  Avoid herbal remedies, as these may contain substances that alter how your body handles medications, or have undesirable effects on you.  Discuss any over the counter medications with your pharmacist or other health care provider.      Missed Doses:    IF YOU REALIZE WITHIN 24 HOURS:    ...You MISSED ONE DOSE of your anticonvulsant medication(s), take the missed dose immediately unless it is time for your next scheduled dose. If that is the case, take your next scheduled dose, wait two hours, and then take the missed dose.    ...You MISSED TWO OR MORE DOSES,  take one of the missed doses immediately, even if it is time for your next scheduled dose. Then call the [facility] clinic to schedule an appointment.     IF YOU REALIZE NOW YOU MISSED A DOSE MORE THAN 24 HOURS AGO, renetta it on your calendar. DO NOT take an extra dose.      An extra dose of an antiepileptic drug is not serious. Ordinarily, at most, you may experience increased side effects for several hours.     When to call 911 for Seizures:    Call 911 if:    The person does not start breathing within 1 minute after the seizure. If this happens call 911 immediately and start CPR.    The person sustains an injury    The person has one seizure right after another without regaining consciousness in between    A GTC seizure lasts over 5 minutes    The person requests an ambulance            Monitor and record       Seizures. Include seizure presentation, length and post seizure symptoms. Bring to follow up appointment.                  Follow-up Appointments     Adult [facility] Follow-up and recommended labs and tests       On Dec 20th at 9 am a nurse coordinator will call you to follow after discharge.     On Jan 31st at 4 pm an office visit is scheduled with Dr. López.     Appointments on [place] [facility] (with [facility] or [facility] provider or service). Call [phone] if you haven't heard regarding these appointments within 7 days of discharge.            Follow Up and recommended labs and tests       In 1 week have your blood checked. (oxcarbazepine and sodium).  In 4 weeks have your blood checked. (oxcarbazepine, sodium, ALEXANDR)    Follow up with a primary care physician for your nutritional and general health needs.                  Your next 10 appointments already scheduled     Dec 20, 2017  9:00 AM CST   Telephone Call with [facility] Nurse 2   [facility] ([facility])    [address], [address]  [facility] [address] [phone]           Note: this is not an  "onsite visit; there is no need to come to the facility.            Jan 31, 2018  4:00 PM CST   Return Visit with Ryland López MD   [facility] ([facility])    [address], [address]  [facility] [id number] [phone]              Future tests that were ordered for you     Oxcarbazepine level           Sodium           Anti Nuclear Nathalia IgG by IFA with Reflex           Oxcarbazepine level           Sodium                 Pending Results     Date and Time Order Name Status Description    12/17/2017 0000 Vitamin D In process     12/17/2017 0000 Oxcarbazepine level In process     12/15/2017 1955 HIV Antigen Antibody Combo In process     12/15/2017 1955 Hepatits C antibody In process     12/12/2017 1634 Vitamin B3 In process             Statement of Approval     Ordered          12/18/17 1158  I have reviewed and agree with all the recommendations and orders detailed in this document.  EFFECTIVE NOW     Approved and electronically signed by:  Danii Hernandez MD             Admission Information     Date & Time Provider Department Dept. Phone    12/12/2017 Danii Hernandez MD [address] [facility] [place] [phone]      Your Vitals Were     Blood Pressure Pulse Temperature Respirations Height Weight    100/64 (BP Location: Right arm) 60 96.4  F (35.8  C) (Axillary) 16 1.651 m (5' 5\") 44.8 kg (98 lb 12.8 oz)    Pulse Oximetry BMI (Body Mass Index)                99% 16.44 kg/m2          HERCAMOSHOPharStyleJam Information     Out[employer] lets you send messages to your doctor, view your test results, renew your prescriptions, schedule appointments and more. To sign up, go to [email or web address] . Click on \"Log in\" on the left side of the screen, which will take you to the Welcome page. Then click on \"Sign up Now\" on the right side of the page.     You will be asked to enter the access code listed below, as well as some personal information. Please follow the directions to create your username and " password.     Your access code is: [email or web address]  Expires: 2018 11:31 AM     Your access code will  in 90 days. If you need help or a new code, please call your Mobile clinic or [phone].        Care EveryWhere ID     This is your Care EveryWhere ID. This could be used by other organizations to access your [facility] medical records  [id number]        Equal Access to Services     RADHA [facility] : Hadii berna arita haddanutao Sosumanthali, waaxda luqadaha, qaybta kaalmada adejaquelineyada, leslee zhang mynorcoreen daniel cindy gin . So [facility] [phone].    ATENCIÓN: Si cat vance, tiene a ingram disposición servicios gratuitos de asistencia lingüística. Sheila al [phone].    We comply with applicable federal civil rights laws and [place] laws. We do not discriminate on the basis of race, color, national origin, age, disability, sex, sexual orientation, or gender identity.               Review of your medicines      START taking        Dose / Directions    melatonin 3 MG tablet   Used for:  Insomnia, unspecified type        Dose:  3 mg   Take 1 tablet (3 mg) by mouth At Bedtime   Quantity:  31 tablet   Refills:  3       OXcarbazepine 600 MG tablet   Commonly known as:  TRILEPTAL   Used for:  Nonintractable epilepsy without status epilepticus, unspecified epilepsy type (H)        Take 1 tablet (600 mg) in the morning and 1.5 tablets (900 mg) in the evening.   Quantity:  75 tablet   Refills:  0         CONTINUE these medicines which have NOT CHANGED        Dose / Directions    IBUPROFEN PO        Dose:  400 mg   Take 400 mg by mouth every 6 hours as needed for moderate pain   Refills:  0       PRENATAL VITAMIN PO        Refills:  0       sennosides 8.6 MG tablet   Commonly known as:  SENOKOT        Dose:  1-2 tablet   Take 1-2 tablets by mouth daily as needed for constipation   Refills:  0         STOP taking     LAMOTRIGINE PO                Where to get your medicines      These medications were sent to [facility]  [facility] - [place] [facility]epeda - [address]  [address] [place] [address]     Phone:  [phone]     melatonin 3 MG tablet    OXcarbazepine 600 MG tablet                Protect others around you: Learn how to safely use, store and throw away your medicines at www.disposemymeds.org.             Medication List: This is a list of all your medications and when to take them. Check marks below indicate your daily home schedule. Keep this list as a reference.      Medications           Morning Afternoon Evening Bedtime As Needed    IBUPROFEN PO   Take 400 mg by mouth every 6 hours as needed for moderate pain                                melatonin 3 MG tablet   Take 1 tablet (3 mg) by mouth At Bedtime   Last time this was given:  3 mg on 12/17/2017 10:17 PM                                OXcarbazepine 600 MG tablet   Commonly known as:  TRILEPTAL   Take 1 tablet (600 mg) in the morning and 1.5 tablets (900 mg) in the evening.   Last time this was given:  600 mg on 12/18/2017  8:59 AM                                PRENATAL VITAMIN PO                                sennosides 8.6 MG tablet   Commonly known as:  SENOKOT   Take 1-2 tablets by mouth daily as needed for constipation

## 2017-12-12 NOTE — PLAN OF CARE
Problem: Patient Care Overview  Goal: Individualization & Mutuality  Patient admitted at 1030 from adult foster care  accompanied by her  appointed guardian for neurological  work up for seizures,alert,non verbal,pt is developmentally  delayed.Vitals stable,pt seen by epilepsy team,admission order written,was started on video EEG.Admission profile started not yet completed.Has purple  bruises on right outer aspect of right thigh and red bruise on right cheek,scattered red rash on face,pt required  total assist with feeding,toileting and grooming.Ambulate with assist of one,gait unsteady.,bed alarm on.No signs of seizure noted since arrival.Continue to monitor per plan of care.

## 2017-12-12 NOTE — PROGRESS NOTES
Social Work: Assessment with Discharge Plan    Patient Name:  Marilyn Cyr  :  1995  Age:  22 year old  MRN:  [id number]  Risk/Complexity Score:     Completed assessment with:  Pts LAKISHA Lainezle Marianalizzie (with [facility], P: [phone])    Presenting Information   Reason for Referral:  Pt taken out of her mothers home last week.  Date of Intake:  2017  Referral Source:  Nurse  Decision Maker:  Pts court appointed guardian (LAKISHA Whittaker).  Alternate Decision Maker:  None.  Health Care Directive: None.  Living Situation:  Pt was residing with her mother and pts siblings. Pt was taken out of the home last  and is now residing at [facility]Adult foster care facility in [place], [place] (addess on facesheet).  Previous Functional Status: Dependent with ADL's, pt is DD.  Patient and family understanding of hospitalization:  Seizure monitoring/study.  Cultural/Language/Spiritual Considerations:  Pt is a part of the [facility].  Adjustment to Illness:  Did not discuss.    Physical Health  Reason for Admission:  No diagnosis found.  Services Needed/Recommended: None.    Mental Health/Chemical Dependency  Diagnosis:  Pt is developmentally delayed.  Support/Services in Place:  Group home/adult foster care. Pt was limited while living with her mother and did not have access to services to help pt.  Services Needed/Recommended:  LAKISHA is working on other services for pt.    Support System  Significant relationship at present time:  LAKISHA Whittaker.  Family of origin is available for support:  No, there is a no contact order for mom.  Other support available:  None.  Gaps in support system:  None.  Patient is caregiver to:  None     Provider Information   Primary Care Physician:  Philippe Moon   [phone]   Clinic:  [address]  / [facility] 5*      :  Blaise Whittaker (P, office:  [phone], cell: [phone], fax: [phone]).    Financial   Income Source: None.  Financial Concerns:  None.  Insurance:    Payor/Plan Subscriber Name Rel Member # Group #   MEDICARE - MEDICARE JOHN GILL  [id number]       ATTN CLAIMS, [address]   BCBS - COMPREHENSIVE * JOHN GILL  [id number]      [address] [address]       Discharge Plan   Patient and family discharge goal: Back to /Adult foster care.  Provided education on discharge plan:  YES  Patient agreeable to discharge plan:  Yes, pts guardian would like her to return to the .  A list of Medicare Certified Facilities was provided to the patient and/or family to encourage patient choice. Patient's choices for facility are:  N/A.  Will NH provide Skilled rehabilitation or complex medical: N/A  General information regarding anticipated insurance coverage and possible out of pocket cost was discussed. Patient and patient's family are aware patient may incur the cost of transportation to the facility, pending insurance payment: N/A.  Barriers to discharge:  Medical stability.    Discharge Recommendations   Anticipated Disposition:  Back to /Adult foster care.  Transportation Needs: Pts LAKISHA will transport her back home. SW is able to stay here until tomorrow afternoon but then has to return home. She will need to know when pt is discharging so she can come back down and  pt.  Name of Transportation Company and Phone: Pts LAKISHA Chey Whittaker [facility]: [phone])    Additional comments     Pt is a DD 23 yo who was removed from her mothers care. Per LAKISHA Chey there was concerns of mom's drug use and drug users coming in and out of the house. There was also a CPS evaluation done at [facility] 9 yo sisters school with pts sister. Pts sister informed CPS that there was drug use and also told CPS that pt was essentially locked in her room all day.     Pt and sister were removed from the home on Monday 12/4. Pt has  been staying at a group home/adult foster care facility called [facility] in [place], [place] (address on facesheet). The Ruby plans to have her return to this facility after she discharges from the hospital. Her SW Chey is only able to stay here until tomorrow afternoon. She is the pts court appointed guardian and will likely remain her guardian going forward.     Per LAKISHA pts mom has a no contact order and should not be given ANY information. LAKISHA is worried pts mom will stop by since she was aware of this appointment.     YANIV Mar, [facility]  7A   Ph: [phone], Pager: [phone]
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
## 2017-12-13 ENCOUNTER — ALLIED HEALTH/NURSE VISIT (OUTPATIENT)
Dept: NEUROLOGY | Facility: CLINIC | Age: 22
DRG: 101 | End: 2017-12-13
Attending: PSYCHIATRY & NEUROLOGY
Payer: MEDICARE

## 2017-12-13 DIAGNOSIS — R56.9 SEIZURES (H): Primary | ICD-10-CM

## 2017-12-13 LAB
ANION GAP SERPL CALCULATED.3IONS-SCNC: 8 MMOL/L (ref 3–14)
BUN SERPL-MCNC: 14 MG/DL (ref 7–30)
CALCIUM SERPL-MCNC: 8.5 MG/DL (ref 8.5–10.1)
CHLORIDE SERPL-SCNC: 111 MMOL/L (ref 94–109)
CO2 SERPL-SCNC: 23 MMOL/L (ref 20–32)
CREAT SERPL-MCNC: 0.82 MG/DL (ref 0.52–1.04)
DSDNA AB SER-ACNC: 2 IU/ML
GFR SERPL CREATININE-BSD FRML MDRD: 86 ML/MIN/1.7M2
GLUCOSE SERPL-MCNC: 79 MG/DL (ref 70–99)
LAMOTRIGINE SERPL-MCNC: 15.3 UG/ML (ref 2.5–15)
MAGNESIUM SERPL-MCNC: 2.4 MG/DL (ref 1.6–2.3)
PHOSPHATE SERPL-MCNC: 3.4 MG/DL (ref 2.5–4.5)
POTASSIUM SERPL-SCNC: 4 MMOL/L (ref 3.4–5.3)
SODIUM SERPL-SCNC: 142 MMOL/L (ref 133–144)

## 2017-12-13 PROCEDURE — 95951 ZZHC EEG VIDEO EACH 24 HR: CPT | Mod: ZF

## 2017-12-13 PROCEDURE — 36415 COLL VENOUS BLD VENIPUNCTURE: CPT | Performed by: PSYCHIATRY & NEUROLOGY

## 2017-12-13 PROCEDURE — 84591 ASSAY OF NOS VITAMIN: CPT | Performed by: PSYCHIATRY & NEUROLOGY

## 2017-12-13 PROCEDURE — 80048 BASIC METABOLIC PNL TOTAL CA: CPT | Performed by: PSYCHIATRY & NEUROLOGY

## 2017-12-13 PROCEDURE — 84100 ASSAY OF PHOSPHORUS: CPT | Performed by: PSYCHIATRY & NEUROLOGY

## 2017-12-13 PROCEDURE — 25000132 ZZH RX MED GY IP 250 OP 250 PS 637: Mod: GY | Performed by: PSYCHIATRY & NEUROLOGY

## 2017-12-13 PROCEDURE — 12000008 ZZH R&B INTERMEDIATE UMMC

## 2017-12-13 PROCEDURE — 83735 ASSAY OF MAGNESIUM: CPT | Performed by: PSYCHIATRY & NEUROLOGY

## 2017-12-13 PROCEDURE — A9270 NON-COVERED ITEM OR SERVICE: HCPCS | Mod: GY | Performed by: PSYCHIATRY & NEUROLOGY

## 2017-12-13 RX ORDER — AMOXICILLIN 250 MG
1 CAPSULE ORAL DAILY
Status: DISCONTINUED | OUTPATIENT
Start: 2017-12-13 | End: 2017-12-18 | Stop reason: HOSPADM

## 2017-12-13 RX ORDER — LORAZEPAM 2 MG/ML
0.5 INJECTION INTRAMUSCULAR
Status: DISCONTINUED | OUTPATIENT
Start: 2017-12-13 | End: 2017-12-18 | Stop reason: HOSPADM

## 2017-12-13 RX ORDER — LAMOTRIGINE 25 MG/1
100 TABLET, CHEWABLE ORAL 2 TIMES DAILY
Status: DISCONTINUED | OUTPATIENT
Start: 2017-12-13 | End: 2017-12-14

## 2017-12-13 RX ADMIN — Medication 75 MG: at 20:48

## 2017-12-13 RX ADMIN — LAMOTRIGINE 200 MG: 100 TABLET ORAL at 10:53

## 2017-12-13 RX ADMIN — LAMOTRIGINE 100 MG: 25 TABLET, CHEWABLE ORAL at 20:49

## 2017-12-13 RX ADMIN — THERA TABS 1 TABLET: TAB at 10:55

## 2017-12-13 RX ADMIN — FOLIC ACID 1 MG: 1 TABLET ORAL at 11:04

## 2017-12-13 RX ADMIN — Medication 100 MG: at 11:04
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
## 2017-12-13 NOTE — PLAN OF CARE
Problem: Patient Care Overview  Goal: Plan of Care/Patient Progress Review  Outcome: No Change  Afebrile, VSS on RA. No evidence of seizure activity. EEG monitoring in place. Pt is nonverbal. R PIV saline locked. Pt is a total assist with meals, takes pills crushed with food. Pt has a red rash on her face and bruises on her R thigh. Pt incontinent of urine. UA is still needed, per MD did not want to straight cath pt to get sample. Pt slept well between cares. Will continue to monitor.

## 2017-12-13 NOTE — PLAN OF CARE
Problem: Patient Care Overview  Goal: Plan of Care/Patient Progress Review  Pt is non verbal. Unknown her orientation. VSS. Remains on EEG monitor. No seizure activity. Pt did not take all of her seizure medication this evening. Medications are crushed in her food. Evening meds went well in her dinner. MD aware. No UA attained. Pt incontinent x2. Her  has been with her throughout the shift. She is spending the night tonight. No BM this shift.

## 2017-12-13 NOTE — PLAN OF CARE
Problem: Patient Care Overview  Goal: Plan of Care/Patient Progress Review  Outcome: No Change  Vital signs stable. Patient non-verbal, up with assistance of 1 staff and gaitbelt. EEG monitoring in place. PIV intact and SL. Patient needs total assistance with meals; took AM pills hidden in banana bread. Patient's face has red rash, has bruises on right thigh. Patient voided 400mL clear yellow urine in toilet, wears pads for intermittent incontinence, no BM since admission. Guardian Chey at bedside supportive and involved in care. Transferred to  at 1140.
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
## 2017-12-13 NOTE — MR AVS SNAPSHOT
After Visit Summary   2017    Marilyn Cyr    MRN: [id number]           Patient Information     Date Of Birth          1995        Visit Information        Provider Department      2017 7:00 AM [facility] EEG TECH 4 [facility] EEG        Today's Diagnoses     Seizures (H)    -  1       Follow-ups after your visit        Who to contact     Please call your clinic at [phone] to:    Ask questions about your health    Make or cancel appointments    Discuss your medicines    Learn about your test results    Speak to your doctor   If you have compliments or concerns about an experience at your clinic, or if you wish to file a complaint, please contact [facility] Patient Relations at [phone] or email us at [email or web address]         Additional Information About Your Visit        MyChart Information     [employer] is an electronic gateway that provides easy, online access to your medical records. With [employer], you can request a clinic appointment, read your test results, renew a prescription or communicate with your care team.     To sign up for [employer] visit the website at [email or web address]   You will be asked to enter the access code listed below, as well as some personal information. Please follow the directions to create your username and password.     Your access code is: [email or web address]  Expires: 2018 11:31 AM     Your access code will  in 90 days. If you need help or a new code, please contact your [facility] or call [phone] for assistance.        Care EveryWhere ID     This is your Care EveryWhere ID. This could be used by other organizations to access your [place] medical records  [id number]         Blood Pressure from Last 3 Encounters:   17 103/67    Weight from Last 3 Encounters:   17 44.8 kg (98 lb 12.8 oz)   10/03/17 46 kg (101 lb 6.4 oz)              Today, you had the  following     No orders found for display         Today's Medication Changes      Notice     This visit is during an admission. Changes to the med list made in this visit will be reflected in the After Visit Summary of the admission.             Primary Care Provider Office Phone # Fax #    Philippe Moon [phone]       [facility] [address] [facility] [address]        Equal Access to Services     WALKER SIBLEY : Hadii aad ku hadasho Soomaali, waaxda luqadaha, qaybta kaalmada adeegyada, waxay idiin hayaan adeeg kharash la'irisn . So [facility] [phone].    ATENCIÓN: Si habla español, tiene a ingram disposición servicios gratuitos de asistencia lingüística. Lindaame al [phone].    We comply with applicable federal civil rights laws and [place] laws. We do not discriminate on the basis of race, color, national origin, age, disability, sex, sexual orientation, or gender identity.            Thank you!     Thank you for choosing [facility]  for your care. Our goal is always to provide you with excellent care. Hearing back from our patients is one way we can continue to improve our services. Please take a few minutes to complete the written survey that you may receive in the mail after your visit with us. Thank you!             Your Updated Medication List - Protect others around you: Learn how to safely use, store and throw away your medicines at www.disposemymeds.org.      Notice     This visit is during an admission. Changes to the med list made in this visit will be reflected in the After Visit Summary of the admission.

## 2017-12-14 ENCOUNTER — ALLIED HEALTH/NURSE VISIT (OUTPATIENT)
Dept: NEUROLOGY | Facility: CLINIC | Age: 22
DRG: 101 | End: 2017-12-14
Attending: PSYCHIATRY & NEUROLOGY
Payer: MEDICARE

## 2017-12-14 DIAGNOSIS — G40.812 LENNOX-GASTAUT SYNDROME WITH TONIC SEIZURES (H): Primary | ICD-10-CM

## 2017-12-14 LAB
ANA PAT SER IF-IMP: ABNORMAL
ANA SER QL IF: POSITIVE
ANA TITR SER IF: ABNORMAL {TITER}
ERYTHROCYTE [DISTWIDTH] IN BLOOD BY AUTOMATED COUNT: 18.3 % (ref 10–15)
HCT VFR BLD AUTO: 38.8 % (ref 35–47)
HGB BLD-MCNC: 11.8 G/DL (ref 11.7–15.7)
MCH RBC QN AUTO: 20 PG (ref 26.5–33)
MCHC RBC AUTO-ENTMCNC: 30.4 G/DL (ref 31.5–36.5)
MCV RBC AUTO: 66 FL (ref 78–100)
PLATELET # BLD AUTO: 176 10E9/L (ref 150–450)
RBC # BLD AUTO: 5.89 10E12/L (ref 3.8–5.2)
WBC # BLD AUTO: 6.3 10E9/L (ref 4–11)

## 2017-12-14 PROCEDURE — 25000132 ZZH RX MED GY IP 250 OP 250 PS 637: Mod: GY | Performed by: PSYCHIATRY & NEUROLOGY

## 2017-12-14 PROCEDURE — A9270 NON-COVERED ITEM OR SERVICE: HCPCS | Mod: GY | Performed by: PSYCHIATRY & NEUROLOGY

## 2017-12-14 PROCEDURE — 12000008 ZZH R&B INTERMEDIATE UMMC

## 2017-12-14 PROCEDURE — 40000141 ZZH STATISTIC PERIPHERAL IV START W/O US GUIDANCE

## 2017-12-14 PROCEDURE — 25000128 H RX IP 250 OP 636: Performed by: PSYCHIATRY & NEUROLOGY

## 2017-12-14 PROCEDURE — 90686 IIV4 VACC NO PRSV 0.5 ML IM: CPT | Performed by: PSYCHIATRY & NEUROLOGY

## 2017-12-14 PROCEDURE — 95951 ZZHC EEG VIDEO EACH 24 HR: CPT | Mod: ZF

## 2017-12-14 PROCEDURE — 85027 COMPLETE CBC AUTOMATED: CPT | Performed by: PSYCHIATRY & NEUROLOGY

## 2017-12-14 PROCEDURE — 36415 COLL VENOUS BLD VENIPUNCTURE: CPT | Performed by: PSYCHIATRY & NEUROLOGY

## 2017-12-14 RX ORDER — LAMOTRIGINE 25 MG/1
50 TABLET, CHEWABLE ORAL 2 TIMES DAILY
Status: COMPLETED | OUTPATIENT
Start: 2017-12-14 | End: 2017-12-15

## 2017-12-14 RX ADMIN — LAMOTRIGINE 50 MG: 25 TABLET, CHEWABLE ORAL at 20:53

## 2017-12-14 RX ADMIN — FOLIC ACID 1 MG: 1 TABLET ORAL at 09:07

## 2017-12-14 RX ADMIN — THERA TABS 1 TABLET: TAB at 09:07

## 2017-12-14 RX ADMIN — MAGNESIUM HYDROXIDE 30 ML: 400 SUSPENSION ORAL at 14:53

## 2017-12-14 RX ADMIN — LAMOTRIGINE 100 MG: 25 TABLET, CHEWABLE ORAL at 09:07

## 2017-12-14 RX ADMIN — Medication 100 MG: at 09:07

## 2017-12-14 RX ADMIN — INFLUENZA A VIRUS A/MICHIGAN/45/2015 X-275 (H1N1) ANTIGEN (FORMALDEHYDE INACTIVATED), INFLUENZA A VIRUS A/HONG KONG/4801/2014 X-263B (H3N2) ANTIGEN (FORMALDEHYDE INACTIVATED), INFLUENZA B VIRUS B/PHUKET/3073/2013 ANTIGEN (FORMALDEHYDE INACTIVATED), AND INFLUENZA B VIRUS B/BRISBANE/60/2008 ANTIGEN (FORMALDEHYDE INACTIVATED) 0.5 ML: 15; 15; 15; 15 INJECTION, SUSPENSION INTRAMUSCULAR at 09:43

## 2017-12-14 ASSESSMENT — ACTIVITIES OF DAILY LIVING (ADL)
RETIRED_EATING: 2-->ASSISTIVE PERSON
COGNITION: 2 - DIFFICULTY WITH ORGANIZING THOUGHTS
BATHING: 2-->ASSISTIVE PERSON
FALL_HISTORY_WITHIN_LAST_SIX_MONTHS: NO
TOILETING: 2-->ASSISTIVE PERSON
DRESS: 2-->ASSISTIVE PERSON
AMBULATION: 2-->ASSISTIVE PERSON
RETIRED_COMMUNICATION: 3-->UNABLE TO SPEAK (NOT RELATED TO LANGUAGE BARRIER)
SWALLOWING: 0-->SWALLOWS FOODS/LIQUIDS WITHOUT DIFFICULTY
TRANSFERRING: 2-->ASSISTIVE PERSON

## 2017-12-14 NOTE — MR AVS SNAPSHOT
After Visit Summary   2017    Marilyn Cyr    MRN: [id number]           Patient Information     Date Of Birth          1995        Visit Information        Provider Department      2017 7:00 AM [facility] EEG TECH 4 [facility] EEG        Today's Diagnoses     Lennox-Gastaut syndrome with tonic seizures (H)    -  1       Follow-ups after your visit        Who to contact     Please call your clinic at [phone] to:    Ask questions about your health    Make or cancel appointments    Discuss your medicines    Learn about your test results    Speak to your doctor   If you have compliments or concerns about an experience at your clinic, or if you wish to file a complaint, please contact [facility] Patient Relations at [phone] or email us at [email or web address]         Additional Information About Your Visit        MyChart Information     [employer] is an electronic gateway that provides easy, online access to your medical records. With [employer], you can request a clinic appointment, read your test results, renew a prescription or communicate with your care team.     To sign up for [employer] visit the website at [email or web address]   You will be asked to enter the access code listed below, as well as some personal information. Please follow the directions to create your username and password.     Your access code is: [email or web address]  Expires: 2018 11:31 AM     Your access code will  in 90 days. If you need help or a new code, please contact your [facility] or call [phone] for assistance.        Care EveryWhere ID     This is your Care EveryWhere ID. This could be used by other organizations to access your [place] medical records  [id number]         Blood Pressure from Last 3 Encounters:   17 95/59    Weight from Last 3 Encounters:   17 44.8 kg (98 lb 12.8 oz)   10/03/17 46 kg (101 lb 6.4 oz)               Today, you had the following     No orders found for display         Today's Medication Changes      Notice     This visit is during an admission. Changes to the med list made in this visit will be reflected in the After Visit Summary of the admission.             Primary Care Provider Office Phone # Fax #    Philippe Moon [phone]       [facility] [address] [address] [address]        Equal Access to Services     WALKER SIBLEY : Hadii aad ku hadasho Soomaali, waaxda luqadaha, qaybta kaalmada adeegyada, waxay idiin hayaan adeeg khhelenjaden [place] . So [facility] [phone].    ATENCIÓN: Si habla español, tiene a ingram disposición servicios gratuitos de asistencia lingüística. Llame al [phone].    We comply with applicable federal civil rights laws and [place] laws. We do not discriminate on the basis of race, color, national origin, age, disability, sex, sexual orientation, or gender identity.            Thank you!     Thank you for choosing [facility]  for your care. Our goal is always to provide you with excellent care. Hearing back from our patients is one way we can continue to improve our services. Please take a few minutes to complete the written survey that you may receive in the mail after your visit with us. Thank you!             Your Updated Medication List - Protect others around you: Learn how to safely use, store and throw away your medicines at www.disposemymeds.org.      Notice     This visit is during an admission. Changes to the med list made in this visit will be reflected in the After Visit Summary of the admission.

## 2017-12-14 NOTE — PLAN OF CARE
Problem: Patient Care Overview  Goal: Plan of Care/Patient Progress Review  Patient admitted for seizure monitoring. VSS. Patient is developmentally delayed and nonverbal - difficult to complete assessment as patient does not follow commands. VEEG, leads intact. One event witnessed (by nursing assistant), twitching of arms and facial flushing. Regular diet - total assist with feeding. Meds need to be hidden in food. PIV SL. Up SBA and GB - needs a lot of direction. Incontient of bladder x3 during the day. No BM for several days - attempted colace (refused) and MOM (had 1/2 mixed with milk). No nonverbal indications of pain. Lamictal is decreased for tonight and will be d/c tomorrow morning. Will continue to monitor.

## 2017-12-14 NOTE — PLAN OF CARE
Pt transferred from  . Video EEG in progress. Leads intact. Pt developmentally delayed. Non-verbal. Pt need guidance with all cares. Incont of urine x2. No BM.  Please order meals and total assist with feeding. Guardian Chey here til 2PM. Pt ambulated x2 in halls with assist of one and a GB. Pt does put things in mouth. Chews in cords,Please cover cords. Cont POC.

## 2017-12-14 NOTE — PLAN OF CARE
Problem: Patient Care Overview  Goal: Plan of Care/Patient Progress Review  Afebrile, VSS on RA. Non verbal. EEG monitoring in place.  Pt had one episode of incontinence during the night. No BM during shift. Total assist with cares and feeding.Up with SBA and gait belt. R PIV-SL. Will continue to monitor and follow plan of care.

## 2017-12-14 NOTE — PROCEDURES
VIDEO EEG NUMBER:  -1       Video EEG day 1.      DATE OF RECORDIN2017      SOURCE FILE DURATION:  11 hours 57 minutes 30 seconds.      CLINICAL SUMMARY:  Marilyn Cyr is a 22-year-old female with history of seizures since childhood and developmental delay who underwent a continuous EEG in evaluation of reportedly intractable epilepsy for characterization, quantification and to assist in medical management.      MEDICATIONS:  Lamotrigine.      TECHNICAL SUMMARY:  This video EEG monitoring procedure was performed using 23 scalp electrodes in the 10/20 system placement with additional scalp, precordial and surface electrodes used for electrical referencing artifact detection.  Video monitoring was utilized and reviewed periodically by the EEG technologist and the physician for electrical clinical correlation.      INTERICTAL FINDINGS:  During wakefulness the background demonstrated generalized polymorphic delta slowing with intermittent superimposed faster frequencies in the theta and alpha ranges, occasionally alpha of up to a maximum of 9 Hz was seen.  However, this was never sustained and she was felt to not have a clear posterior dominant rhythm.  No clinical periods of sleep were captured in the state of recording, and no sleep architecture was seen.      Rare bursts of generalized fast activity were seen at a rate of about 20 seconds.  These bursts lasted no more than 1 second at a time and at times occurred concurrently with superimposed rhythmic theta over the left temporal area with maximum around T3-C3 at a rate of about 7 Hz.      ICTAL FINDINGS:  None.      IMPRESSION:  Video EEG day 2.  This procedure was abnormal.   1.  Continuous generalized delta with some rare faster activities in the theta and alpha ranges were seen without any sustained posterior dominant rhythm.  This is consistent with moderate diffuse encephalopathy.  2.  Rare bursts of generalized high frequency activity were  seen.  These may represent generalized paroxysmal fast activity (GPFA); however, during this limited recording sample these bursts of activity were fragmented and not typical for GPFA based on their brief duration (less than 1 second).  While this recording did not ambiguously demonstrate it, GPFA, if present, would be supportive of a diagnosis of Lennox-Gastaut.      No electrographic seizures were recorded.  Ongoing recording in an attempt to capture electrographic seizures as well as to better characterize the aforementioned abnormalities may be warranted.     I personally reviewed this study and report and ammended dictation as appropriate.     CAIT KINCAID MD       As dictated by FELICIA PERES MD            D: 2017 17:52   T: 2017 06:51   MT: SK      Name:     JOHN GILL   MRN:      [id number]        Account:        [id number]   :      1995           Procedure Date: 2017      Document: [id number]
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
## 2017-12-15 ENCOUNTER — ALLIED HEALTH/NURSE VISIT (OUTPATIENT)
Dept: NEUROLOGY | Facility: CLINIC | Age: 22
DRG: 101 | End: 2017-12-15
Attending: PSYCHIATRY & NEUROLOGY
Payer: MEDICARE

## 2017-12-15 DIAGNOSIS — G40.909 SEIZURE DISORDER (H): Primary | ICD-10-CM

## 2017-12-15 LAB
CRP SERPL-MCNC: <2.9 MG/L (ref 0–8)
ERYTHROCYTE [SEDIMENTATION RATE] IN BLOOD BY WESTERGREN METHOD: 10 MM/H (ref 0–20)
TSH SERPL DL<=0.005 MIU/L-ACNC: 2.66 MU/L (ref 0.4–4)

## 2017-12-15 PROCEDURE — 25000132 ZZH RX MED GY IP 250 OP 250 PS 637: Mod: GY | Performed by: PSYCHIATRY & NEUROLOGY

## 2017-12-15 PROCEDURE — 86235 NUCLEAR ANTIGEN ANTIBODY: CPT | Performed by: STUDENT IN AN ORGANIZED HEALTH CARE EDUCATION/TRAINING PROGRAM

## 2017-12-15 PROCEDURE — 86800 THYROGLOBULIN ANTIBODY: CPT | Performed by: STUDENT IN AN ORGANIZED HEALTH CARE EDUCATION/TRAINING PROGRAM

## 2017-12-15 PROCEDURE — 12000008 ZZH R&B INTERMEDIATE UMMC

## 2017-12-15 PROCEDURE — 86140 C-REACTIVE PROTEIN: CPT | Performed by: PSYCHIATRY & NEUROLOGY

## 2017-12-15 PROCEDURE — 36415 COLL VENOUS BLD VENIPUNCTURE: CPT | Performed by: PSYCHIATRY & NEUROLOGY

## 2017-12-15 PROCEDURE — 87389 HIV-1 AG W/HIV-1&-2 AB AG IA: CPT | Performed by: STUDENT IN AN ORGANIZED HEALTH CARE EDUCATION/TRAINING PROGRAM

## 2017-12-15 PROCEDURE — A9270 NON-COVERED ITEM OR SERVICE: HCPCS | Mod: GY | Performed by: PSYCHIATRY & NEUROLOGY

## 2017-12-15 PROCEDURE — 86160 COMPLEMENT ANTIGEN: CPT | Performed by: PSYCHIATRY & NEUROLOGY

## 2017-12-15 PROCEDURE — 40000802 ZZH SITE CHECK

## 2017-12-15 PROCEDURE — 85652 RBC SED RATE AUTOMATED: CPT | Performed by: PSYCHIATRY & NEUROLOGY

## 2017-12-15 PROCEDURE — 86803 HEPATITIS C AB TEST: CPT | Performed by: STUDENT IN AN ORGANIZED HEALTH CARE EDUCATION/TRAINING PROGRAM

## 2017-12-15 PROCEDURE — 86376 MICROSOMAL ANTIBODY EACH: CPT | Performed by: STUDENT IN AN ORGANIZED HEALTH CARE EDUCATION/TRAINING PROGRAM

## 2017-12-15 PROCEDURE — 95951 ZZHC EEG VIDEO EACH 24 HR: CPT | Mod: ZF

## 2017-12-15 PROCEDURE — 36415 COLL VENOUS BLD VENIPUNCTURE: CPT | Performed by: STUDENT IN AN ORGANIZED HEALTH CARE EDUCATION/TRAINING PROGRAM

## 2017-12-15 PROCEDURE — 84443 ASSAY THYROID STIM HORMONE: CPT | Performed by: STUDENT IN AN ORGANIZED HEALTH CARE EDUCATION/TRAINING PROGRAM

## 2017-12-15 PROCEDURE — 86780 TREPONEMA PALLIDUM: CPT | Performed by: STUDENT IN AN ORGANIZED HEALTH CARE EDUCATION/TRAINING PROGRAM

## 2017-12-15 RX ORDER — OXCARBAZEPINE 300 MG/1
300 TABLET, FILM COATED ORAL
Status: COMPLETED | OUTPATIENT
Start: 2017-12-15 | End: 2017-12-15

## 2017-12-15 RX ORDER — BISACODYL 5 MG
15 TABLET, DELAYED RELEASE (ENTERIC COATED) ORAL DAILY PRN
Status: DISCONTINUED | OUTPATIENT
Start: 2017-12-15 | End: 2017-12-18 | Stop reason: HOSPADM

## 2017-12-15 RX ORDER — POLYETHYLENE GLYCOL 3350 17 G/17G
17 POWDER, FOR SOLUTION ORAL DAILY PRN
Status: DISCONTINUED | OUTPATIENT
Start: 2017-12-15 | End: 2017-12-18 | Stop reason: HOSPADM

## 2017-12-15 RX ORDER — OXCARBAZEPINE 300 MG/1
600 TABLET, FILM COATED ORAL 2 TIMES DAILY
Status: DISCONTINUED | OUTPATIENT
Start: 2017-12-16 | End: 2017-12-17

## 2017-12-15 RX ORDER — POLYETHYLENE GLYCOL 3350 17 G/17G
17 POWDER, FOR SOLUTION ORAL DAILY
Status: DISCONTINUED | OUTPATIENT
Start: 2017-12-15 | End: 2017-12-15

## 2017-12-15 RX ORDER — BISACODYL 5 MG
10 TABLET, DELAYED RELEASE (ENTERIC COATED) ORAL DAILY PRN
Status: DISCONTINUED | OUTPATIENT
Start: 2017-12-15 | End: 2017-12-18 | Stop reason: HOSPADM

## 2017-12-15 RX ORDER — POLYETHYLENE GLYCOL 3350 17 G/17G
17 POWDER, FOR SOLUTION ORAL ONCE
Status: COMPLETED | OUTPATIENT
Start: 2017-12-15 | End: 2017-12-15

## 2017-12-15 RX ORDER — BISACODYL 5 MG
5 TABLET, DELAYED RELEASE (ENTERIC COATED) ORAL DAILY PRN
Status: DISCONTINUED | OUTPATIENT
Start: 2017-12-15 | End: 2017-12-18 | Stop reason: HOSPADM

## 2017-12-15 RX ADMIN — OXCARBAZEPINE 300 MG: 300 TABLET, FILM COATED ORAL at 21:38

## 2017-12-15 RX ADMIN — LAMOTRIGINE 50 MG: 25 TABLET, CHEWABLE ORAL at 08:38

## 2017-12-15 RX ADMIN — OXCARBAZEPINE 300 MG: 300 TABLET, FILM COATED ORAL at 17:59

## 2017-12-15 RX ADMIN — Medication 3 MG: at 21:39

## 2017-12-15 RX ADMIN — SENNOSIDES AND DOCUSATE SODIUM 1 TABLET: 8.6; 5 TABLET ORAL at 08:39

## 2017-12-15 RX ADMIN — OXCARBAZEPINE 300 MG: 300 TABLET, FILM COATED ORAL at 15:35

## 2017-12-15 RX ADMIN — OXCARBAZEPINE 450 MG: 300 TABLET ORAL at 12:20

## 2017-12-15 RX ADMIN — THERA TABS 1 TABLET: TAB at 08:39

## 2017-12-15 RX ADMIN — Medication 75 MG: at 08:40

## 2017-12-15 RX ADMIN — FOLIC ACID 1 MG: 1 TABLET ORAL at 08:39

## 2017-12-15 RX ADMIN — POLYETHYLENE GLYCOL 3350 17 G: 17 POWDER, FOR SOLUTION ORAL at 12:20
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
## 2017-12-15 NOTE — PROGRESS NOTES
Writer updated patient's guardian, Chey, by telephone of patient's status. I explained to Chey that we had captured multiple seizures on EEG and are in the process of changing her seizure medications. I informed her that rheumatology was consulted for concern for SLE based upon her facial rash and lab results. We discussed that Marilyn will likely be ready to discharge on Monday; Chey requires a 12 hour notice for discharge to arrange her .       Please contact Chey [phone] on Sunday if discharge is planned for Monday.

## 2017-12-15 NOTE — MR AVS SNAPSHOT
After Visit Summary   12/15/2017    Marilyn Cyr    MRN: [id number]           Patient Information     Date Of Birth          1995        Visit Information        Provider Department      12/15/2017 7:00 AM [facility] EEG TECH 4 [facility] EEG        Today's Diagnoses     Seizure disorder (H)    -  1       Follow-ups after your visit        Who to contact     Please call your clinic at [phone] to:    Ask questions about your health    Make or cancel appointments    Discuss your medicines    Learn about your test results    Speak to your doctor   If you have compliments or concerns about an experience at your clinic, or if you wish to file a complaint, please contact [facility] Patient Relations at [phone] or email us at [email or web address]         Additional Information About Your Visit        MyChart Information     Symphogen is an electronic gateway that provides easy, online access to your medical records. With Symphogen, you can request a clinic appointment, read your test results, renew a prescription or communicate with your care team.     To sign up for CuPcAkE & other things you baket visit the website at [email or web address]   You will be asked to enter the access code listed below, as well as some personal information. Please follow the directions to create your username and password.     Your access code is: [email or web address]  Expires: 2018 11:31 AM     Your access code will  in 90 days. If you need help or a new code, please contact your [facility] or call [phone] for assistance.        Care EveryWhere ID     This is your Care EveryWhere ID. This could be used by other organizations to access your [place] medical records  [id number]         Blood Pressure from Last 3 Encounters:   12/15/17 116/63    Weight from Last 3 Encounters:   17 44.8 kg (98 lb 12.8 oz)   10/03/17 46 kg (101 lb 6.4 oz)              Today, you had the  following     No orders found for display         Today's Medication Changes      Notice     This visit is during an admission. Changes to the med list made in this visit will be reflected in the After Visit Summary of the admission.             Primary Care Provider Office Phone # Fax #    Philippe Moon [phone]       [facility] [address] [facility] [address]        Equal Access to Services     WALKER SIBLEY : Hadii aad ku hadasho Soomaali, waaxda luqadaha, qaybta kaalmada adeegyada, waxay idiin hayaan adeeg kharash la'irisn . So [facility] [phone].    ATENCIÓN: Si habla español, tiene a ingram disposición servicios gratuitos de asistencia lingüística. Lindaame al [phone].    We comply with applicable federal civil rights laws and [place] laws. We do not discriminate on the basis of race, color, national origin, age, disability, sex, sexual orientation, or gender identity.            Thank you!     Thank you for choosing [facility]  for your care. Our goal is always to provide you with excellent care. Hearing back from our patients is one way we can continue to improve our services. Please take a few minutes to complete the written survey that you may receive in the mail after your visit with us. Thank you!             Your Updated Medication List - Protect others around you: Learn how to safely use, store and throw away your medicines at www.disposemymeds.org.      Notice     This visit is during an admission. Changes to the med list made in this visit will be reflected in the After Visit Summary of the admission.
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
## 2017-12-15 NOTE — PLAN OF CARE
Problem: Patient Care Overview  Goal: Plan of Care/Patient Progress Review  Outcome: No Change  Patient continues on VEEG monitoring. No events noted tonight. Patient awake a lot tonight and chewing on multi items in room. Incontinent of urine and wearing a brief. Non verbal. MAYES and ambulates to bathroom with assist of one. Patient child like and not aggressive. Continue to monitor and treat per plan of care. Patient would benefit from someone who knows her to be her and push button when patient has events. Also to keep her from chewing on things. Continue with plan of care.

## 2017-12-15 NOTE — PLAN OF CARE
Problem: Patient Care Overview  Goal: Plan of Care/Patient Progress Review  Patient admitted for seizure monitoring. VSS. Patient is developmentally delayed and nonverbal - difficult to complete assessment as patient does not follow commands. VEEG, leads intact. No events witnessed or reported this shift. Regular diet - total assist with feeding. Meds need to be hidden in food. PIV SL, no no in place to prevent from pulling. Up SBA and GB - needs a lot of direction. Incontinent of bladder x2 during the day. Had large BM today, incontinent. No nonverbal indications of pain. Trileptal started this afternoon. Will continue to monitor.

## 2017-12-15 NOTE — PLAN OF CARE
Problem: Seizure Disorder/Epilepsy (Adult)  Goal: Signs and Symptoms of Listed Potential Problems Will be Absent, Minimized or Managed (Seizure Disorder/Epilepsy)  Signs and symptoms of listed potential problems will be absent, minimized or managed by discharge/transition of care (reference Seizure Disorder/Epilepsy (Adult) CPG).   Admit for seizure monitoring. VSS. Developmentally delayed; non-verbal. Doesn t follow most commands. VEEG leads in place; 2 events this shift with rigid arms and facial flushing; please see flowsheet. Charge and MD notified; no Ativan per MD. Regular diet; poor intake this shift. Refused senna. Pulled IV this shift, replaced and no-no in place. Incontinent x3 this shift. No outward signs of pain. Medications put in food. Monitor closely for patient chewing on cords and other materials, continue to distract from chewing for better EEG readings. Continue to monitor and follow current POC.

## 2017-12-16 ENCOUNTER — ALLIED HEALTH/NURSE VISIT (OUTPATIENT)
Dept: NEUROLOGY | Facility: CLINIC | Age: 22
DRG: 101 | End: 2017-12-16
Attending: PSYCHIATRY & NEUROLOGY
Payer: MEDICARE

## 2017-12-16 DIAGNOSIS — G40.812 LENNOX-GASTAUT SYNDROME WITH TONIC SEIZURES (H): Primary | ICD-10-CM

## 2017-12-16 LAB — T PALLIDUM IGG+IGM SER QL: NEGATIVE

## 2017-12-16 PROCEDURE — 12000008 ZZH R&B INTERMEDIATE UMMC

## 2017-12-16 PROCEDURE — 95951 ZZHC EEG VIDEO EACH 24 HR: CPT | Mod: ZF

## 2017-12-16 PROCEDURE — 25000132 ZZH RX MED GY IP 250 OP 250 PS 637: Mod: GY | Performed by: PSYCHIATRY & NEUROLOGY

## 2017-12-16 PROCEDURE — 40000556 ZZH STATISTIC PERIPHERAL IV START W US GUIDANCE

## 2017-12-16 PROCEDURE — A9270 NON-COVERED ITEM OR SERVICE: HCPCS | Mod: GY | Performed by: PSYCHIATRY & NEUROLOGY

## 2017-12-16 RX ADMIN — OXCARBAZEPINE 600 MG: 300 TABLET ORAL at 20:55

## 2017-12-16 RX ADMIN — Medication 3 MG: at 20:55

## 2017-12-16 RX ADMIN — THERA TABS 1 TABLET: TAB at 07:41

## 2017-12-16 RX ADMIN — OXCARBAZEPINE 600 MG: 300 TABLET ORAL at 07:41

## 2017-12-16 RX ADMIN — FOLIC ACID 1 MG: 1 TABLET ORAL at 07:41

## 2017-12-16 NOTE — PLAN OF CARE
Problem: Patient Care Overview  Goal: Plan of Care/Patient Progress Review  Outcome: No Change  Pt on 6A for seizure monitoring. VEEG leads in place, no events witnessed or reported this shift. VSS. Unable to assess orientation or neuros d/t pt non verbal and doesn't follow commands. Hx of developmental delay. PIV SL, covered with no no. On regular diet, pt is total feed, also takes meds crushed and hidden in food. Up with SBA and GB. Incontinent of bladder, brief in place, no BM this shift. BA on at all times. Will continue to monitor and follow POC.

## 2017-12-16 NOTE — PLAN OF CARE
Problem: Seizure Disorder/Epilepsy (Adult)  Goal: Signs and Symptoms of Listed Potential Problems Will be Absent, Minimized or Managed (Seizure Disorder/Epilepsy)  Signs and symptoms of listed potential problems will be absent, minimized or managed by discharge/transition of care (reference Seizure Disorder/Epilepsy (Adult) CPG).   Outcome: No Change  VSS. No s/s of pain. Neuros hard to asses. Pt is developmentally delayed and does not follow all commands. Pt had hygiene break today, leads replaced. Poor appetite, feeder. Takes pills hidden in food.  Inc x 2, did walk to bathroom with no results. MD aware of rash on face, no interventions at this time. Unsure of DC plans at this time, pt came from group home.

## 2017-12-16 NOTE — PLAN OF CARE
Problem: Patient Care Overview  Goal: Plan of Care/Patient Progress Review    Pt on VEEG monitoring, no events this shift. Unable to complete thorough assessment as pt is developmentally delayed but MAYES. VSS, no nonverbal indicators of pain. EEG leads intact; no attempts to chew on cords. Incontinent of bladder x1. Up with SBA and GB. PIV replaced and covered with no-no guard. On reg diet and needs assist with feeding; gets meds crushed and hidden in food. Red rash to face -- seen by Rheumatology yesterday, on list for metronidazole cream order, per Rheumatology recs. Continue to monitor and follow POC.

## 2017-12-16 NOTE — MR AVS SNAPSHOT
After Visit Summary   2017    Marilyn Cyr    MRN: [id number]           Patient Information     Date Of Birth          1995        Visit Information        Provider Department      2017 7:00 AM [facility] EEG TECH 4 [facility] EEG        Today's Diagnoses     Lennox-Gastaut syndrome with tonic seizures (H)    -  1       Follow-ups after your visit        Your next 10 appointments already scheduled     Dec 17, 2017  7:00 AM CST   24 Hour Video Visit with [facility] EEG TECH 4   [facility] EEG ([facility])    [address] [address] [phone]           [place]: Your appointment is scheduled at [facility]. [address] [place] [address]              Who to contact     Please call your clinic at [phone] to:    Ask questions about your health    Make or cancel appointments    Discuss your medicines    Learn about your test results    Speak to your doctor   If you have compliments or concerns about an experience at your clinic, or if you wish to file a complaint, please contact [facility] Patient Relations at [phone] or email us at [email or web address]         Additional Information About Your Visit        MyChart Information     "[employer]hart is an electronic gateway that provides easy, online access to your medical records. With Revisu, you can request a clinic appointment, read your test results, renew a prescription or communicate with your care team.     To sign up for Proximal Datat visit the website at [email or web address]   You will be asked to enter the access code listed below, as well as some personal information. Please follow the directions to create your username and password.     Your access code is: [email or web address]  Expires: 2018 11:31 AM     Your access code will  in 90 days. If you need help or a new code, please contact your  [facility] or call [phone] for assistance.        Care EveryWhere ID     This is your Care EveryWhere ID. This could be used by other organizations to access your [place] medical records  [id number]         Blood Pressure from Last 3 Encounters:   12/16/17 113/79    Weight from Last 3 Encounters:   12/12/17 44.8 kg (98 lb 12.8 oz)   10/03/17 46 kg (101 lb 6.4 oz)              Today, you had the following     No orders found for display         Today's Medication Changes      Notice     This visit is during an admission. Changes to the med list made in this visit will be reflected in the After Visit Summary of the admission.             Primary Care Provider Office Phone # Fax #    Philippe Moon [phone]       [address] [address]        Equal Access to Services     [facility]byterian[facility] : Hadii berna arita hadasho Soomaali, waaxda luqadaha, qaybta kaalmada adeegyada, leslee zhang hayclifford greenfield . So [facility] [phone].    ATENCIÓN: Si habla español, tiene a ingram disposición servicios gratuitos de asistencia lingüística. Llame al [phone].    We comply with applicable federal civil rights laws and [place] laws. We do not discriminate on the basis of race, color, national origin, age, disability, sex, sexual orientation, or gender identity.            Thank you!     Thank you for choosing [facility]  for your care. Our goal is always to provide you with excellent care. Hearing back from our patients is one way we can continue to improve our services. Please take a few minutes to complete the written survey that you may receive in the mail after your visit with us. Thank you!             Your Updated Medication List - Protect others around you: Learn how to safely use, store and throw away your medicines at www.disposemymeds.org.      Notice     This visit is during an admission. Changes to the med list made in  this visit will be reflected in the After Visit Summary of the admission.

## 2017-12-16 NOTE — PROGRESS NOTES
"NEUROLOGY/EPILEPSY ATTENDING NOTE    No unusual occurrences per RN notes. Patient unable to provide history.    AEDs: oxcabazepine 600 mg twice a day. Loaded with 1350 mg yesterday. Lamotrigine: 0    EXAM:  /62 (BP Location: Left arm)  Pulse 87  Temp 95.8  F (35.4  C) (Axillary)  Resp 16  Ht 1.651 m (5' 5\")  Wt 44.8 kg (98 lb 12.8 oz)  SpO2 99%  BMI 16.44 kg/m2    Continues with papular rash over cheekbones and chin with some erythema. Not different from yesterday. Alert. Mute. Does not follow commands. Responds to visual threat bilaterally. Follows examiner, EOMI. Smile symmetrical. Moves all four limbs. Bilateral spasiticity. Abdomen soft with active bowel sounds. Legs without swelling.    EEG: Moderate to severe diffuse encephalopathy. Some changes since yesterday with sleep spindles now seen during sleep. Independent bitemporal sharp waves are noted which is also a change. No seizures recorded since yesterday morning prior to initiation of oxcarbazepine.    Appreciate rheumatology note. They do not think she has overt SLE and did not offer an opinion regarding drug induced SLE. Suggested that rash may be due to rosacea rather than SLE and suggested metronidazole cream. Suggested other evaluation for abnormal ALEXANDR which was ordered.    Lamotrigine level on admission approx 15.     IMPRESSION  1) Not entirely clear whether she has lennox gastaut syndrome (LGS)  symptomatic partial epilepsy or (most likely) an overlap syndrome. Typical LGS may evolve over time to symptomatic complex partial seizures. Interictal EEG supports partial epilepsy but again people with LGS may develop independent bitemporal sharp waves on EEG over time as well. The characteristics of the seizure suggested tonic seizure at onset but evolution into partial complex seizure over time.  2) Thus far seizures appear controlled on oxcarbazepine which she is tolerating well. 1200 mg is an aggressive dose for her weight. Still too early " to know whether seizures will be controlled and given that seizures have not been detected by staff, even in hospital, need further monitoring to confirm that this medication will be effective. In retrospect suspect that seizures were well controlled with lamotrigine at high doses but this may have been associated with drug induced SLE.  3) Not clear whether she has drug induced SLE related to lamotrigine. The improvement in malar rash suggests this may be the case; however there hasn't been much change since yesterday. Improvement of skin condition and ALEXANDR titre after several weeks off lamotrigine will help sort this through, if it occurs.    PLAN:  1) Continue video-EEG monitoring.  2) Continue current OXC dose.  3) Tentative discharge Monday morning.  4) Oxcarbazepine level, sodium in AM. Consider repeating ALEXANDR in several weeks.  5) Vit D to exclude deficiency given her poor nutritional state and need for long term AEDs. OXC could perhaps increase vitamin D requirements tho data on this is poor.    Ryland López MD  Pager [phone]

## 2017-12-17 ENCOUNTER — ALLIED HEALTH/NURSE VISIT (OUTPATIENT)
Dept: NEUROLOGY | Facility: CLINIC | Age: 22
DRG: 101 | End: 2017-12-17
Attending: PSYCHIATRY & NEUROLOGY
Payer: MEDICARE

## 2017-12-17 DIAGNOSIS — G40.812 LENNOX-GASTAUT SYNDROME WITH TONIC SEIZURES (H): Primary | ICD-10-CM

## 2017-12-17 LAB
C3 SERPL-MCNC: 75 MG/DL (ref 76–169)
C4 SERPL-MCNC: 19 MG/DL (ref 15–50)
SODIUM SERPL-SCNC: 143 MMOL/L (ref 133–144)

## 2017-12-17 PROCEDURE — 80183 DRUG SCRN QUANT OXCARBAZEPIN: CPT | Performed by: PSYCHIATRY & NEUROLOGY

## 2017-12-17 PROCEDURE — 12000008 ZZH R&B INTERMEDIATE UMMC

## 2017-12-17 PROCEDURE — A9270 NON-COVERED ITEM OR SERVICE: HCPCS | Mod: GY | Performed by: PSYCHIATRY & NEUROLOGY

## 2017-12-17 PROCEDURE — 95951 ZZHC EEG VIDEO EACH 24 HR: CPT | Mod: ZF

## 2017-12-17 PROCEDURE — 25000132 ZZH RX MED GY IP 250 OP 250 PS 637: Mod: GY | Performed by: PSYCHIATRY & NEUROLOGY

## 2017-12-17 PROCEDURE — 82306 VITAMIN D 25 HYDROXY: CPT | Performed by: PSYCHIATRY & NEUROLOGY

## 2017-12-17 PROCEDURE — 36415 COLL VENOUS BLD VENIPUNCTURE: CPT | Performed by: PSYCHIATRY & NEUROLOGY

## 2017-12-17 PROCEDURE — 84295 ASSAY OF SERUM SODIUM: CPT | Performed by: PSYCHIATRY & NEUROLOGY

## 2017-12-17 RX ORDER — OXCARBAZEPINE 300 MG/1
600 TABLET, FILM COATED ORAL EVERY MORNING
Status: DISCONTINUED | OUTPATIENT
Start: 2017-12-18 | End: 2017-12-18 | Stop reason: HOSPADM

## 2017-12-17 RX ORDER — OXCARBAZEPINE 300 MG/1
900 TABLET, FILM COATED ORAL AT BEDTIME
Status: DISCONTINUED | OUTPATIENT
Start: 2017-12-17 | End: 2017-12-18 | Stop reason: HOSPADM

## 2017-12-17 RX ADMIN — FOLIC ACID 1 MG: 1 TABLET ORAL at 09:06

## 2017-12-17 RX ADMIN — Medication 75 MG: at 09:06

## 2017-12-17 RX ADMIN — OXCARBAZEPINE 600 MG: 300 TABLET ORAL at 09:06

## 2017-12-17 RX ADMIN — SENNOSIDES AND DOCUSATE SODIUM 1 TABLET: 8.6; 5 TABLET ORAL at 09:06

## 2017-12-17 RX ADMIN — Medication 3 MG: at 22:17

## 2017-12-17 RX ADMIN — OXCARBAZEPINE 900 MG: 300 TABLET ORAL at 22:17

## 2017-12-17 RX ADMIN — OXCARBAZEPINE 450 MG: 300 TABLET ORAL at 00:32

## 2017-12-17 RX ADMIN — THERA TABS 1 TABLET: TAB at 09:06

## 2017-12-17 NOTE — PROGRESS NOTES
Social Work Services Progress Note    Hospital Day: 6  Date of Initial Social Work Evaluation:  12/12/17  Collaborated with:  Pt's nurse, Rosi RN, Pt's SW Chey Remedios    Data:  Consulted with pt's nurse, Rosi , has confirmed pt will be readiness for discharge tomorrow. Requested SW contact pt's guardian, Chey to inform of anticipated discharge and to provide 12 hr notice.    Intervention:  Phoned pt's SW Chey and informed her of pt's anticipated discharge for tomorrow. Chey stated that she plans on arriving to unit by noon tomorrow and will transport pt back to group home.    Assessment:  NA    Plan:    Anticipated Disposition:  Facility:  [facility]    Barriers to d/c plan:  NA    Follow Up:  Pt's SW will provide transport at time of discharge    MILENA Martinez  Weekend   Pager:[phone]

## 2017-12-17 NOTE — PLAN OF CARE
Problem: Patient Care Overview  Goal: Plan of Care/Patient Progress Review  Outcome: No Change  VSS. No s/s of pain. Neuros hard to asses. Pt is developmentally delayed and does not follow all commands. Good appetite this afternoon, feeder. Takes pills hidden in food.  Inc x 2, did walk to bathroom with no results. Plan is to DC tomorrow. Charge RN aware to page SW to set up with pt .

## 2017-12-17 NOTE — PROCEDURES
TYPE OF STUDY: Inpatient video-EEG monitoring    EEG #:  -2     DATE OF RECORDIN2017      SOURCE FILE DURATION:  19 hours 8 minutes 19 seconds.      CLINICAL SUMMARY:  Marilyn Cyr is a 22-year-old female with history of seizures since childhood and developmental delay who underwent continuous EEG in evaluation of reportedly intractable epilepsy for characterization and quantification of seizures and to assist in medical management.     TECHNICAL SUMMARY: This continuous video- EEG monitoring procedure was performed with 23 scalp electrodes in 10-20 electrode system placements, and additional scalp, precordial and other surface electrodes used for electrical referencing and artifact detection.  Video monitoring was utilized and periodically reviewed by EEG technologists and the physician for electroclinical correlations.    MEDICATIONS:  Lamotrigine.      TECHNICAL SUMMARY:  This video-EEG monitoring procedure was performed using 23 scalp electrodes in the 10-20 system placement with additional scalp, precordial and surface electrodes used for elliptical referencing and artifact detection.  Video monitoring was utilized and reviewed periodically by the EEG technologist and the physician for electroclinical correlation.      INTERICTAL FINDINGS:  During wakefulness, background demonstrated generalized polymorphic delta slowing with intermittent superimposed faster frequencies in the theta and alpha ranges occasionally generally between 7-9 Hz.  However, no sustained alpha was seen and no clear posterior dominant rhythm was appreciated.  During periods of sleep, repetitive P waves and K complexes could be seen intermittently.  Well-formed sleep spindles were not appreciated.      Rare bursts of focal 7 Hz theta was seen over the left temporal area during sleep.  Additionally, rare bursts of generalized fast activity around 14-16 Hz was seen during sleep.  This did not have a frontal predominance and  was not consistent with sleep spindle.  However, the time and duration of this fast activity was no more than 1 second at 1-2 seconds maximum at a time and therefore was not felt to definitively felt to represent GPFA.      ICTAL FINDINGS:  None.      IMPRESSION:   Video EEG day #2.  This procedure was abnormal.     1.  Continuous generalized delta with some rare faster alpha and theta activities were seen without any sustained posterior dominant rhythm.  This is consistent with a mild to moderate diffuse encephalopathy and can be seen in developmental delay.   2.  Rare bursts of generalized high frequency activity were seen during sleep.  This may represent generalized paroxysmal fast activity (GPFA), however, these bursts were shorter in duration than typically expected in GPFA.      No electrographic seizures were recorded.  Ongoing recording in order to capture electrographic seizures may be warranted.     I personally reviewed this study and report and ammended dictation as appropriate.     CAIT KINCAID MD       As dictated by FELICIA PERES MD            D: 12/15/2017 16:30   T: 2017 08:27   MT: LQ      Name:     JOHN GILL   MRN:      [id number]        Account:        [id number]   :      1995           Procedure Date: 2017      Document: [id number]

## 2017-12-17 NOTE — PROCEDURES
EEG -3       VIDEO EEG DAY #3      DATE OF RECORDIN2017      SOURCE FILE DURATION:  23 hours 42 minutes 58 seconds.      CLINICAL SUMMARY:  Marilyn Cyr is a 22-year-old female with history of intractable epilepsy since childhood, as well as developmental delay who underwent EEG in characterization of her seizures and to assist in medical management.      MEDICATIONS:  Lamotrigine.      TECHNICAL SUMMARY:  This video-EEG monitoring procedure was performed using 23 scalp electrodes in the 10-20 system placement with additional scalp, precordial and other surface electrodes used for electrical referencing infected section.  Video monitoring was utilized and reviewed periodically by the EEG technologist and the physician for electroclinical correlation.      INTERICTAL FINDINGS:  During wakefulness, the background demonstrated generalized polymorphic delta slowing intermixed with some faster theta and alpha frequencies.  No clear posterior dominant rhythm was seen at any time.  During sleep, occasional V waves and K complexes could be appreciated and rare 14 Hz sleep spindles were seen.        Occasional focal theta slowing was seen independently on the right and left temporal areas during sleep.      ICTAL FINDINGS:  Five electrographic seizures were captured at 1207, 1853, , , and .  All of these followed a similar clinical seminology and all occurred during wakefulness.  Clinically, the patient's head would suddenly look towards the left and then drop down and shortly after this, the right arm would stiffen and raise and after this the left arm would often accompany that.  After this some intermittent shaking of the right arm would be seen, and often the left as well.  After this, she would have lip smacking and invariably towards the end of the seizure she would rub her face, always with her right hand and sometimes with her left as well.  Electrographically background was invariably  obscured partially by muscle artifact at seizure onset.  By the time the EEG record becomes interpretable, a pattern of at maximum 2.5 Hz generalized sharp waves could be seen diffusely and symmetrically and these would sometimes evolve moving from 2.5 to 1.5 Hz or accelerating up to 2.5 Hz before ending with diffuse attenuation and slowing throughout all electrodes.  These seizures lasted generally approximately 30 seconds.  However, the seizure at 2000 lasted closer to 90 seconds when the patient appeared to have the generalized sharp wave slow down after the first 45 seconds and then build up.        IMPRESSION:  Video EEG day #3:  This procedure was abnormal.   1.  Generalized polymorphic delta slowing with intermixed theta and alpha activities were seen throughout wakefulness consistent with a mild to moderate encephalopathy and is consistent with the patient's known developmental delay.   2.  Five seizures were captured on this date of recording. Clinical features were consistent with generalized tonic seizures which then evolved to complex partial seizures. Ictal EEG was neither lateralizing nor localizing and features were consistent with both tonic and complex partial seizures.     I personally reviewed this study and report and ammended dictation as appropriate.     CAIT KINCAID MD       As dictated by FELICIA PERES MD            D: 12/15/2017 16:48   T: 2017 07:57   MT: SEDRICK      Name:     JOHN GILL   MRN:      [id number]        Account:        [id number]   :      1995           Procedure Date: 2017      Document: [id number]

## 2017-12-17 NOTE — PLAN OF CARE
Problem: Patient Care Overview  Goal: Plan of Care/Patient Progress Review  Outcome: No Change  Pt on 6A for seizure monitoring. VEEG leads in place, no events reported. Writer witnessed one possible event while writer and nursing assistant were in pts room, pt was sitting close to edge of bed, leaned forward and had BUE flexed towards body, event button was pressed, lasted only a few seconds . VSS. Unable to assess orientation or neuros d/t pt non verbal and doesn't follow commands. Hx of developmental delay. PIV SL, covered with no no. On regular diet, pt is total feed, also takes meds crushed and hidden in food. Up with SBA and GB. Incontinent of bladder, brief in place, no BM this shift. BA on at all times. Will continue to monitor and follow POC.

## 2017-12-17 NOTE — PROGRESS NOTES
"NEUROLOGY/EPILEPSY ATTENDING NOTE    Single seizure yesterday noted by staff. Otherwise staff does not report any changes. No bowel movement. Eating reasonably well. Continuing alert.    AEDs: oxcarbazepine 600-600 yesterday; received additional 450 mg after seizure. Off lamotrigine.    EXAM:  Temp: 96.2  F (35.7  C) Temp src: Axillary BP: 127/81 Pulse: 60 Heart Rate: 55 Resp: 16 SpO2: 97 % O2 Device: None (Room air)   Height: 5' 5\" (165.1 cm) Weight: 98 lb 12.8 oz (44.8 kg)  Eyes open, smiles, does not appear in distress. Microcephalic. Malar rash does not appear changed and clearly involves chin. I do not see rash on abdomen. Bowel sounds active with normal pitch. Legs without swelling. Follows examiner with full versions. Moves face symmetrically. Moves all four limbs to stimulation. Increased tone bilaterally.    EEG: More slowing and some probable sleep spindles during sleep. Moderate slowing while awake. Occasoinal independent bitemporal sharp waves. No slow spike wave. Occasional runs diffuse alpha, brief, does not meet typical criteria for generalized praoxysmal fast activity. Single seizure yesterday afternoon with head to left, flexion both arms and then more flexion at waist and some trembling. Postictally rubbed face with hand. Duration about 40 seconds. EEG with diffuse delta onset, then rhythmic 1 Hz sharp waves and abrupt offset.    Sodium 143. RPR negative. Remaining labs ordered by rheumatology are pending. OXC level from this morning pending.    IMPRESSION  1) Epilepsy syndrome uncertain. More features suggest partial epilepsy rather than LGS but she may have had LGS which then gave way to multifocal partial epilepsy. Clearly some response to oxcarbazepine; not yet clear whether this will completely control her seizures. Indirect information suggests that high doses (600 mg, approx 15 mg/kg) and levels (15) may have been associated with complete seizure control.  2) Question lamotrigine associated " lupus syndrome. Only moderately elevated ALEXANDR which is relatively nonspecific. It is not clear whether facial rash is malar rash or rosacea.  3) Appears to be tolerating higher doses than usual with moderate levels suggesting that her liver clears liver metabolized enzymes faster than most. She may be able to tolerate higher oxcarbazepine doses. Levels are pending.    PLAN:  1) Continue video-EEG monitoring.  2) Increase maintenance OXC to 600 in AM and 900 in PM (total 1500 mg/d)  3) Short term goal is maximization of OXC. If ineffective would probably add clobazam at night; alternatively could consider rufinamide depending on insurance coverage. We are hoping to keep her off lamotrigine for at least one month and determine whether rash improves and ALEXANDR normalizes. If no change could retry lamotrigine and treat facial rash with metronidzole cream on the theory that rash is due to rosacea rather than SLE.  4) Tentative discharge tomorrow. Physician followup in 6 weeks. OXC level and sodium in one week. ALEXANDR, OXC level and sodium in one month.    Ryland López MD  Pager [phone]

## 2017-12-17 NOTE — PROCEDURES
EEG #:  -4      DATE OF STUDY: 12/15/2017.      TYPE OF STUDY:  Inpatient video EEG monitoring.        DURATION OF RECORDIN hours 19 minutes 45 seconds.      HISTORY:  Day #4 of video-EEG monitoring in patient, Marilyn Cyr, a 22-year-old being evaluated for epilepsy.  She reports spells of stiffening and shaking.  She has moderate mental retardation.  She was being treated with lamotrigine 400-600 mg per day, which was discontinue to facilitate recording of seizures.  Evaluation indicated that lamotrigine was possibly responsible for a drug-induced lupus state and so she is being rapidly transitioned to oxcarbazepine.  Because of the severity of her seizures off medications, video EEG monitoring is being continued to exclude subtle seizures during the transition  are subtle and were not detected by staff, though they had been detected with video EEG monitoring previously.  The patient received oxcarbazepine 1350 mg during this 24-hour period.     TECHNICAL SUMMARY: This continuous video- EEG monitoring procedure was performed with 23 scalp electrodes in 10-20 electrode system placements, and additional scalp, precordial and other surface electrodes used for electrical referencing and artifact detection.  Video monitoring was utilized and periodically reviewed by EEG technologists and the physician for electroclinical correlations.    FINDINGS:  While awake, desynchronized background without a well-defined posterior dominant rhythm.  Runs of irregular 3-4 Hz delta is seen at times, occasionally in brief runs, occasionally somewhat more continuously.  There is a clear difference between wakefulness and sleep.  With sleep, more irregular slowing is seen.  Sleep spindles are noted and these are a new finding, not having been seen previously in this study.  Primitive vertex waves are seen as well.       OTHER INTERICTAL ABNORMALITIES:  Independent bitemporal sharp waves are seen with F7, T3 and F8 T4  amplitude maximum (e.g., 04:32:35, 04:34:04, 04:34:33, 04:46:23, 04:46:24).  These appeared somewhat more persistent on the left than on the right.  In the available samples, they occurred exclusively during sleep.      ICTAL ACTIVITIES:  No electrographic seizures.      IMPRESSION:  Abnormal.  There is evidence of a moderate to severe diffuse encephalopathy.  There have been some subtle improvements in the background in that sleep spindles are now seen.  On the other hand, independent bitemporal epileptiform discharges are now seen whereas these were not previously seen in this series of studies.  No electrographic seizures were seen, suggesting that oxcarbazepine may be an effective treatment for this patient's seizure disorder.  However, period of recording on oxcarbazepine has been limited and a longer period of recording is necessary to confirm this.         CAIT KINCAID MD             D: 2017 10:47   T: 2017 09:28   MT: OLIVER      Name:     JOHN GILL   MRN:      [id number]        Account:        [id number]   :      1995           Procedure Date: 12/15/2017      Document: [id number]

## 2017-12-17 NOTE — PLAN OF CARE
Problem: Patient Care Overview  Goal: Plan of Care/Patient Progress Review    Pt on VEEG monitoring, no events this shift, EEG leads intact. MAYES spontaneously, no nonverbal indicators of pain. PIV sl'd, good po intake, incontinent of urine x1, no bm. Meds crushed and hidden in food. Up SBA and GB. Continue to monitor.

## 2017-12-17 NOTE — MR AVS SNAPSHOT
After Visit Summary   2017    Marilyn Cyr    MRN: [id number]           Patient Information     Date Of Birth          1995        Visit Information        Provider Department      2017 7:00 AM [facility] EEG TECH 4 [facility]        Today's Diagnoses     Lennox-Gastaut syndrome with tonic seizures (H)    -  1       Follow-ups after your visit        Your next 10 appointments already scheduled     Dec 18, 2017  7:00 AM CST   24 Hour Video Visit with [facility] EEG TECH 4   [facility] ([facility])    [address] [address] [phone]           Jonancy: Your appointment is scheduled at [facility]. [address] [place] [id number]              Who to contact     Please call your clinic at [phone] to:    Ask questions about your health    Make or cancel appointments    Discuss your medicines    Learn about your test results    Speak to your doctor   If you have compliments or concerns about an experience at your clinic, or if you wish to file a complaint, please contact [facility] Patient Relations at [phone] or email us at [email or web address]         Additional Information About Your Visit        MyChart Information     [employer] is an electronic gateway that provides easy, online access to your medical records. With [employer], you can request a clinic appointment, read your test results, renew a prescription or communicate with your care team.     To sign up for [employer] visit the website at [email or web address]   You will be asked to enter the access code listed below, as well as some personal information. Please follow the directions to create your username and password.     Your access code is: [email or web address]  Expires: 2018 11:31 AM     Your access code will  in 90 days. If you need help or a new code, please contact your  [facility] or call [phone] for assistance.        Care EveryWhere ID     This is your Care EveryWhere ID. This could be used by other organizations to access your [place] medical records  [id number]         Blood Pressure from Last 3 Encounters:   12/17/17 127/81    Weight from Last 3 Encounters:   12/12/17 44.8 kg (98 lb 12.8 oz)   10/03/17 46 kg (101 lb 6.4 oz)              Today, you had the following     No orders found for display         Today's Medication Changes      Notice     This visit is during an admission. Changes to the med list made in this visit will be reflected in the After Visit Summary of the admission.             Primary Care Provider Office Phone # Fax #    Philippe Moon [phone]       [address] [address]        Equal Access to Services     [facility] CenterDONALD : Hadii berna arita hadasho Soomaali, waaxda luqadaha, qaybta kaalmada adeegyada, leslee zhang hayclifford greenfield . So [facility] [phone].    ATENCIÓN: Si habla español, tiene a ingram disposición servicios gratuitos de asistencia lingüística. Llame al [phone].    We comply with applicable federal civil rights laws and [place] laws. We do not discriminate on the basis of race, color, national origin, age, disability, sex, sexual orientation, or gender identity.            Thank you!     Thank you for choosing [facility]  for your care. Our goal is always to provide you with excellent care. Hearing back from our patients is one way we can continue to improve our services. Please take a few minutes to complete the written survey that you may receive in the mail after your visit with us. Thank you!             Your Updated Medication List - Protect others around you: Learn how to safely use, store and throw away your medicines at www.disposemymeds.org.      Notice     This visit is during an admission. Changes to the med list made in  this visit will be reflected in the After Visit Summary of the admission.

## 2017-12-18 ENCOUNTER — ALLIED HEALTH/NURSE VISIT (OUTPATIENT)
Dept: NEUROLOGY | Facility: CLINIC | Age: 22
DRG: 101 | End: 2017-12-18
Attending: PSYCHIATRY & NEUROLOGY
Payer: MEDICARE

## 2017-12-18 ENCOUNTER — CARE COORDINATION (OUTPATIENT)
Dept: CARE COORDINATION | Facility: CLINIC | Age: 22
End: 2017-12-18

## 2017-12-18 VITALS
BODY MASS INDEX: 16.46 KG/M2 | OXYGEN SATURATION: 99 % | WEIGHT: 98.8 LBS | SYSTOLIC BLOOD PRESSURE: 100 MMHG | HEART RATE: 60 BPM | TEMPERATURE: 96.4 F | HEIGHT: 65 IN | RESPIRATION RATE: 16 BRPM | DIASTOLIC BLOOD PRESSURE: 64 MMHG

## 2017-12-18 DIAGNOSIS — G40.812 LENNOX-GASTAUT SYNDROME WITH TONIC SEIZURES (H): Primary | ICD-10-CM

## 2017-12-18 LAB
DEPRECATED CALCIDIOL+CALCIFEROL SERPL-MC: 21 UG/L (ref 20–75)
ENA RNP IGG SER IA-ACNC: 0.3 AI (ref 0–0.9)
ENA SCL70 IGG SER IA-ACNC: <0.2 AI (ref 0–0.9)
ENA SM IGG SER-ACNC: <0.2 AI (ref 0–0.9)
ENA SS-A IGG SER IA-ACNC: <0.2 AI (ref 0–0.9)
ENA SS-B IGG SER IA-ACNC: <0.2 AI (ref 0–0.9)
HCV AB SERPL QL IA: NONREACTIVE
HIV 1+2 AB+HIV1 P24 AG SERPL QL IA: NONREACTIVE
THYROGLOB AB SERPL IA-ACNC: <20 IU/ML (ref 0–40)
THYROPEROXIDASE AB SERPL-ACNC: <10 IU/ML

## 2017-12-18 PROCEDURE — 25000132 ZZH RX MED GY IP 250 OP 250 PS 637: Mod: GY | Performed by: PSYCHIATRY & NEUROLOGY

## 2017-12-18 PROCEDURE — A9270 NON-COVERED ITEM OR SERVICE: HCPCS | Mod: GY | Performed by: PSYCHIATRY & NEUROLOGY

## 2017-12-18 PROCEDURE — 95951 ZZHC EEG VIDEO < 12 HR: CPT | Mod: 52,ZF

## 2017-12-18 RX ORDER — LANOLIN ALCOHOL/MO/W.PET/CERES
3 CREAM (GRAM) TOPICAL AT BEDTIME
Qty: 31 TABLET | Refills: 3 | Status: SHIPPED | OUTPATIENT
Start: 2017-12-18

## 2017-12-18 RX ORDER — OXCARBAZEPINE 600 MG/1
TABLET, FILM COATED ORAL
Qty: 75 TABLET | Refills: 0 | Status: SHIPPED | OUTPATIENT
Start: 2017-12-18 | End: 2018-01-11

## 2017-12-18 RX ADMIN — THERA TABS 1 TABLET: TAB at 08:59

## 2017-12-18 RX ADMIN — OXCARBAZEPINE 600 MG: 300 TABLET ORAL at 08:59

## 2017-12-18 RX ADMIN — SENNOSIDES AND DOCUSATE SODIUM 1 TABLET: 8.6; 5 TABLET ORAL at 08:59

## 2017-12-18 RX ADMIN — FOLIC ACID 1 MG: 1 TABLET ORAL at 08:59

## 2017-12-18 NOTE — PLAN OF CARE
Problem: Patient Care Overview  Goal: Plan of Care/Patient Progress Review  Outcome: No Change  VSS. Pt nonverbal, follows no commands. No events witnessed this shift. Pt on regular diet, needs 1:1 assistance with feeding. Takes pills crushed and hidden in food. PIV SL. Up w/ A1 and GB. Incontinent of urine. Discharge summary reviewed with guardianChey. No questions at this time.
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
## 2017-12-18 NOTE — PROGRESS NOTES
CPT: 59297-18  [facility]-d/c'ed before Noon  MD: David

## 2017-12-18 NOTE — PLAN OF CARE
Problem: Patient Care Overview  Goal: Plan of Care/Patient Progress Review  Outcome: No Change  On 6A for seizure monitoring. VEEG leads in place, no events witnessed or reported this shift. Orientation and neuros difficult to assess d/t pt does not follow commands. Hx of developmental delay. On regular diet, ate well this shift, takes pills crushed in food. PIV SL, no-no guard in place. Pt is incontinent of bladder, no BM this shift. Up with A1. BA on at all times. Pt likes to chew on cords/straws. Does not use call light appropriately. Plan to D/C tomorrow at noon. Will continue to monitor and follow POC.

## 2017-12-18 NOTE — MR AVS SNAPSHOT
After Visit Summary   12/18/2017    Marilyn Cyr    MRN: [id number]           Patient Information     Date Of Birth          1995        Visit Information        Provider Department      12/18/2017 7:00 AM [facility] EEG TECH 4 [facility] EEG        Today's Diagnoses     Lennox-Gastaut syndrome with tonic seizures (H)    -  1       Follow-ups after your visit        Your next 10 appointments already scheduled     Dec 20, 2017  9:00 AM CST   Telephone Call with [facility] Nurse 2   [facility] ([facility])    [address], [address]  [facility] [address] [phone]           Note: this is not an onsite visit; there is no need to come to the facility.            Jan 31, 2018  4:00 PM CST   Return Visit with Ryland López MD   [facility] ([facility])    [address], [address]  [place] [address] [phone]              Who to contact     Please call your clinic at [phone] to:    Ask questions about your health    Make or cancel appointments    Discuss your medicines    Learn about your test results    Speak to your doctor   If you have compliments or concerns about an experience at your clinic, or if you wish to file a complaint, please contact [facility] Patient Relations at [phone] or email us at [email or web address]         Additional Information About Your Visit        MyChart Information     [employer] is an electronic gateway that provides easy, online access to your medical records. With [employer], you can request a clinic appointment, read your test results, renew a prescription or communicate with your care team.     To sign up for [employer] visit the website at [email or web address]   You will be asked to enter the access code listed below, as well as some personal information. Please follow the directions to create your username and password.     Your access code  is: [email or web address]  Expires: 2018 11:31 AM     Your access code will  in 90 days. If you need help or a new code, please contact your [facility] or call [phone] for assistance.        Care EveryWhere ID     This is your Care EveryWhere ID. This could be used by other organizations to access your [facility] medical records  [id number]         Blood Pressure from Last 3 Encounters:   17 100/64    Weight from Last 3 Encounters:   17 44.8 kg (98 lb 12.8 oz)   10/03/17 46 kg (101 lb 6.4 oz)              Today, you had the following     No orders found for display         Today's Medication Changes      Notice     This visit is during an admission. Changes to the med list made in this visit will be reflected in the After Visit Summary of the admission.             Primary Care Provider Office Phone # Fax #    Philippe Moon [phone]       [address] [address]        Equal Access to Services     [facility]: Hadii aad ku hadasho Soomaali, waaxda luqadaha, qaybta kaalmada adeegyada, waxay idiin hayclifford greenfield . So [facility] [phone].    ATENCIÓN: Si habla español, tiene a ingram disposición servicios gratuitos de asistencia lingüística. Llame al [phone].    We comply with applicable federal civil rights laws and [place] laws. We do not discriminate on the basis of race, color, national origin, age, disability, sex, sexual orientation, or gender identity.            Thank you!     Thank you for choosing [facility]  for your care. Our goal is always to provide you with excellent care. Hearing back from our patients is one way we can continue to improve our services. Please take a few minutes to complete the written survey that you may receive in the mail after your visit with us. Thank you!             Your Updated Medication List - Protect others around you: Learn how to safely use, store  and throw away your medicines at www.disposemymeds.org.      Notice     This visit is during an admission. Changes to the med list made in this visit will be reflected in the After Visit Summary of the admission.

## 2017-12-18 NOTE — PROGRESS NOTES
Patient will be contacted by an RN for post DC follow up so no duplicate post DC follow up call will be made            Dec 20, 2017  9:00 AM CST   Telephone Call with [facility] Nurse 2   [facility] ([facility])     [address], [address]  [place] [place] [id number] [phone]
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
## 2017-12-18 NOTE — PLAN OF CARE
Problem: Patient Care Overview  Goal: Plan of Care/Patient Progress Review  Outcome: Improving    Pt here on VEEG monitoring, no events this shift, EEG leads intact. Unable to thoroughly assess neuros as pt is developmentally delayed and does not follow all commands. MAYES spont. No s/s ox of pain. Slept through the night. Up with SBA and GB, no BM this shift, incontinent of urine, PIV sl'd, good po intake (needs assist with feeding; gets pills crushed and hidden in food). Plan to discharge to group home today at 12noon. Will be picked up by group home staff.

## 2017-12-18 NOTE — PROCEDURES
DATE OF STUDY:  2017    TYPE OF STUDY: Inpatient video-EEG monitoring     EEG#:  -5      DURATION OF RECORDIN hours 26 minutes 35 seconds.      HISTORY:  Day #5 of video-EEG monitoring in Marilyn Cyr, a 22-year-old being evaluated for medically intractable epilepsy.  She has severe developmental delay.  She has spells of stiffening and jerking that are reportedly refractory to lamotrigine.  There was also concern that lamotrigine was causing a drug-induced lupus syndrome.  Lamotrigine was discontinued to facilitate the recording of seizures.  Oxcarbazepine has subsequently been started to confirm that seizures are controlled.  Her seizures are subtle and are probably being missed by staff and so monitoring is necessary to detect these.  During this study she was treated with oxcarbazepine 600 mg b.i.d.  An additional 450 mg of oxcarbazepine was administered following the recorded seizure.    TECHNICAL SUMMARY: This continuous video- EEG monitoring procedure was performed with 23 scalp electrodes in 10-20 electrode system placements, and additional scalp, precordial and other surface electrodes used for electrical referencing and artifact detection.  Video monitoring was utilized and periodically reviewed by EEG technologists and the physician for electroclinical correlations.     FINDINGS:  While awake, attenuated background.  Occasional runs of diffuse 12 Hz alpha are seen lasting less than a second (EEG# ).  These are not associated with any clinical correlate.  During sleep, diffuse delta is noted.  Delta semi-rhythmic at times.  There is a clear difference between wakefulness and sleep.  Sleep spindles are not noted.      INTERICTAL ABNORMALITIES:  Independent bitemporal sharp waves are seen, T3, F7 amplitude maximum on the left, T4 on the right.  These are seen mostly during sleep.  They are somewhat more common on the left than on the right.  Generalized sharp waves or slow  spike-wave is not seen.  Classical generalized paroxysmal fast activity is not seen.      ICTAL:  Seizure is noted starting clinically at around 23:07:22.  The patient leans forward with head slightly to the left and with bilateral tremors.  She rocks forward.  Staff does not notice seizure until patient leans over side of the bed.  They then attend to patient who does not appear to respond.  She then rubs her face with her right hand after the seizure is over.  Electrographically, EEG is somewhat obscured by myogenic artifact.  With 15 Hz filter, diffuse irregular delta is seen at around 20:37 31.  More rhythmic sharpish theta is seen at around 20:37:40, and this is seen bilaterally at 20:37:47.  Delta frequencies gradually slow and terminate at around 20:38:00.  Rhythmic activity is difficult to ascertain without filter.  However, comparison to previous seizures confirms that the pattern is identical.      IMPRESSION:  Abnormal.  There is evidence of moderate diffuse encephalopathy, but there is a clear difference between sleep and wake.  Independent bitemporal epileptiform activity is seen indicating focal cortical irritability in these regions.  There are short brief runs of bilateral alpha.  It is not clear whether these are a form of generalized paroxysmal fast activity or some other EEG phenomenon.  A seizure is recorded at 20:37:50.  Electrographic and some clinical features suggest that this is an unlateralized complex partial seizure.  However, other clinical features suggest that this may be a form of tonic seizure as well.  Overall a complex partial seizure is thought to be more likely. Given patient's interictal behavior, the clinical manifestations are somewhat subtle but they are clearly stereotyped and identical to previous seizures.  The ictal discharge, although obscured by myogenic artifact, is also identical to previous ictal discharges.         CAIT KINCAID MD             D: 12/17/2017  12:01   T: 2017 05:09   MT: sincere      Name:     JOHN GILL   MRN:      [id number]        Account:        [id number]   :      1995           Procedure Date: 2017      Document: [id number]

## 2017-12-19 LAB — 10OH-CARBAZEPINE SERPL-MCNC: 17.7 UG/ML

## 2017-12-19 NOTE — PROCEDURES
EEG #:  -7       VIDEO EEG DAY #7.      DATE OF RECORDIN2017.      DURATION OF RECORDIN hours, 5 minutes.      CLINICAL SUMMARY:  Marilyn Cyr is a 22-year-old female with history of developmental delay and seizures since childhood who underwent an inpatient EEG in evaluation of medically intractable epilepsy.      MEDICATIONS:  The patient was taking lamotrigine on admission; however, this has been discontinued.  She presently is taking only oxcarbazepine as her sole anticonvulsant.       INTERICTAL FINDINGS:  During maximal wakefulness, the background demonstrated generalized polymorphic delta-theta slowing with some intermixed faster generalized alpha and beta frequencies of approximately 9 Hz and 16 Hz, respectively.  No well-sustained posterior dominant alpha rhythm was seen.      During sleep, the background rhythm slowed further and attenuated and became more predominanted by delta frequencies.  Occasional symmetric K complexes were seen during sleep and bursts of fast activity of around 16-18 Hz were seen during sleep.  However, these did not have a frontal predominance and based on their frequency did not appear consistent with sleep spindles.  Normal sleep spindles are not seen at any time.  Occasional left temporal sharp waves were seen during sleep with a maximum at T3.  Additionally, left frontotemporal focal theta slowing was seen intermittently during wakefulness and sleep.      ICTAL FINDINGS:  None.      IMPRESSION VIDEO EEG DAY 7: Abnormal.   1.  Generalized polymorphic delta-theta slowing was seen during wakefulness consistent with a mild to moderate encephalopathy.   2.  Occasional focal left temporal theta slowing was seen during wakefulness and sleep consistent with focal cortical dysfunction in that area.   3.  Focal left temporal sharp waves were seen during sleep consistent with an increased tendency of seizures to arise out of the area.  No electrographic seizures  were noted on this date of recording.      SUMMARY OF 7 DAYS OF VIDEO EEG MONITORING:  This continuous 7-day EEG monitoring procedure was abnormal.   1.  Continuous generalized polymorphic delta-theta slowing was seen throughout the recording consistent with mild to moderate encephalopathy.  This may be consistent with the patient's known underlying developmental delay.   2.  Intermittent left temporal focal theta slowing was seen, consistent focal cortical dysfunction in that area.   3.  Intermittent focal left temporal sharp discharges were seen, consistent with increased tendency of seizures to arise out of that area.   4.  Rare bursts of paroxysmal fast activity were seen during sleep at a rate of 16-18 Hz.  However, these did not last longer than 1-2 seconds at most and did not appear to be typical for classic GPFA.   5.  Six electrographic seizures were captured during this monitoring period - five on day #3 and one on day #5. All of these appeared clinically consistent with tonic seizures at onset followed by a complex partial seminology consisting of arm tremors and lip smacking.  Associated ictal EEG changes were neither localizing nor lateralizing.    No additional seizures were seen in the final 36 hours of this recording after the patient's oxcarbazepine had been titrated upward to 600-900.  Clinical correlation is advised.         DANII BARBOSA MD       As dictated by FELICIA PERES MD     I agree with the findings as reported. I personally reviewed this EEG recording and made edits to this report as needed.     Danii Barbosa MD   Epilepsy Attending                D: 2017 13:35   T: 2017 09:17   MT: CG      Name:     JOHN GILL   MRN:      [id number]        Account:        [id number]   :      1995           Procedure Date: 2017      Document: [id number]

## 2017-12-19 NOTE — PROCEDURES
EEG #:  -6       VIDEO EEG DAY #6.      DATE OF RECORDIN2017.      SOURCE FILE DURATION:  23 hours, 18 minutes.      CLINICAL SUMMARY:  Marilyn Cyr is a 22-year-old female with history of developmental delay and seizures since childhood who underwent a continuous EEG in evaluation medically intractable epilepsy.      MEDICATIONS:  Patient was taking lamotrigine on admission, but this has been discontinued.  Her only anticonvulsant medication at present is oxcarbazepine.      TECHNICAL SUMMARY:  This video-EEG monitoring procedure was performed using 23 scalp electrodes in the 10-20 system placement with additional scalp, precordial and other surface electrodes used for electrical referencing and artifact detection.  Video monitoring was utilized and reviewed periodically by the EEG technologist and the physician for electroclinical correlation.      INTERICTAL FINDINGS:  Throughout the recording, the background demonstrated continuous generalized polymorphic delta-theta slowing, with occasional intermixed faster generalized alpha and beta frequencies.  No sustained posterior dominant rhythm was appreciated. During sleep the background rhythm slowed further and occasional repetitive V waves could be seen as well as K complexes.  Occasional faster beta frequencies were seen in sleep which bore some appearance to sleep spindles; however, they were faster in frequency than is typically expected (occurring at around 18 Hz).  The distribution was also not typical as it had a central parietal or temporal prominence moreso than frontal.  Normal sleep spindles were not noted.      Intermittent focal left frontotemporal sharp waves with a maximum around T3 were seen as well as focal left temporal theta slowing again around T3.  This was appreciated both during wakefulness and sleep.      ICTAL FINDINGS:  None.      IMPRESSION:  Abnormal.   1.  Continuous generalized polymorphic delta-theta slowing was seen  during wakefulness consistent with a mild to moderate encephalopathy and may be consistent with the patient's history of developmental delay.   2.  Intermittent focal left temporal slowing as well as left temporal sharp waves were seen consistent with focal cortical dysfunction and increased tendency of seizures to arise out of that area.     3. No clinical nor electrographic seizures were seen at any time.  Clinical correlation is advised.         DANII BARBOSA MD       As dictated by FELICIA PERES MD     I agree with the findings as reported. I personally reviewed this EEG recording and made edits to this report as needed.     Danii Barbosa MD   Epilepsy Attending                D: 2017 13:22   T: 2017 09:06   MT: CG      Name:     JOHN GILL   MRN:      [id number]        Account:        [id number]   :      1995           Procedure Date: 2017      Document: [id number]

## 2017-12-20 ENCOUNTER — VIRTUAL VISIT (OUTPATIENT)
Dept: NEUROLOGY | Facility: CLINIC | Age: 22
End: 2017-12-20
Payer: COMMERCIAL

## 2017-12-20 DIAGNOSIS — G40.812 LENNOX-GASTAUT SYNDROME WITH TONIC SEIZURES (H): Primary | ICD-10-CM

## 2017-12-20 NOTE — MR AVS SNAPSHOT
After Visit Summary   2017    Marilyn Cyr    MRN: [id number]           Patient Information     Date Of Birth          1995        Visit Information        Provider Department      2017 9:00 AM 2, Me [facility] Nurse [facility]         Follow-ups after your visit        Your next 10 appointments already scheduled     2018  4:00 PM CST   Return Visit with MD [facility] ([facility])    [address], [address]  [facility] [address] [phone]              Who to contact     Please call your clinic at [phone] to:    Ask questions about your health    Make or cancel appointments    Discuss your medicines    Learn about your test results    Speak to your doctor   If you have compliments or concerns about an experience at your clinic, or if you wish to file a complaint, please contact [facility] Patient Relations at [phone] or email us at [email or web address]         Additional Information About Your Visit        MyChart Information     [employer] is an electronic gateway that provides easy, online access to your medical records. With [employer]oinka, you can request a clinic appointment, read your test results, renew a prescription or communicate with your care team.     To sign up for Xoinka visit the website at [email or web address]   You will be asked to enter the access code listed below, as well as some personal information. Please follow the directions to create your username and password.     Your access code is: [email or web address]  Expires: 2018 11:31 AM     Your access code will  in 90 days. If you need help or a new code, please contact your [facility] or call [phone] for assistance.        Care EveryWhere ID     This is your Care EveryWhere ID. This could be used by other organizations to access your [facility]  records  [id number]         Blood Pressure from Last 3 Encounters:   12/18/17 100/64    Weight from Last 3 Encounters:   12/12/17 44.8 kg (98 lb 12.8 oz)   10/03/17 46 kg (101 lb 6.4 oz)              Today, you had the following     No orders found for display       Primary Care Provider Office Phone # Fax #    Philippe Moon [phone]       [address] [address]        Equal Access to Services     [facility] : Hadii aad ku hadasho Soomaali, waaxda luqadaha, qaybta kaalmada adeegyada, waxcathy zhang hayclifford greenfield . So [facility] [phone].    ATENCIÓN: Si habla español, tiene a ingram disposición servicios gratuitos de asistencia lingüística. LlSelect [facility] [phone].    We comply with applicable federal civil rights laws and [place] laws. We do not discriminate on the basis of race, color, national origin, age, disability, sex, sexual orientation, or gender identity.            Thank you!     Thank you for choosing [facility]  for your care. Our goal is always to provide you with excellent care. Hearing back from our patients is one way we can continue to improve our services. Please take a few minutes to complete the written survey that you may receive in the mail after your visit with us. Thank you!             Your Updated Medication List - Protect others around you: Learn how to safely use, store and throw away your medicines at www.disposemymeds.org.          This list is accurate as of: 12/20/17 11:00 AM.  Always use your most recent med list.                   Brand Name Dispense Instructions for use Diagnosis    IBUPROFEN PO      Take 400 mg by mouth every 6 hours as needed for moderate pain        melatonin 3 MG tablet     31 tablet    Take 1 tablet (3 mg) by mouth At Bedtime    Insomnia, unspecified type       OXcarbazepine 600 MG tablet    TRILEPTAL    75 tablet    Take 1 tablet (600 mg) in the morning and 1.5 tablets  (900 mg) in the evening.    Nonintractable epilepsy without status epilepticus, unspecified epilepsy type (H)       PRENATAL VITAMIN PO           sennosides 8.6 MG tablet    SENOKOT     Take 1-2 tablets by mouth daily as needed for constipation

## 2017-12-21 LAB — NIACIN SERPL-MCNC: 1.66 UG/ML (ref 0.5–8.45)

## 2017-12-26 ENCOUNTER — TRANSFERRED RECORDS (OUTPATIENT)
Dept: HEALTH INFORMATION MANAGEMENT | Facility: CLINIC | Age: 22
End: 2017-12-26

## 2017-12-27 DIAGNOSIS — G40.909 NONINTRACTABLE EPILEPSY WITHOUT STATUS EPILEPTICUS, UNSPECIFIED EPILEPSY TYPE (H): ICD-10-CM

## 2017-12-27 DIAGNOSIS — R21 MALAR RASH: ICD-10-CM

## 2017-12-29 DIAGNOSIS — G40.909 NONINTRACTABLE EPILEPSY WITHOUT STATUS EPILEPTICUS, UNSPECIFIED EPILEPSY TYPE (H): ICD-10-CM

## 2017-12-29 LAB
OXCARBAZEPINE: 30 MCG/ML (ref 3–35)
SODIUM SERPL-SCNC: 143 MEQ/L (ref 135–145)

## 2018-01-06 ENCOUNTER — HEALTH MAINTENANCE LETTER (OUTPATIENT)
Age: 23
End: 2018-01-06

## 2018-01-11 ENCOUNTER — TELEPHONE (OUTPATIENT)
Dept: NEUROLOGY | Facility: CLINIC | Age: 23
End: 2018-01-11

## 2018-01-11 DIAGNOSIS — G40.909 NONINTRACTABLE EPILEPSY WITHOUT STATUS EPILEPTICUS, UNSPECIFIED EPILEPSY TYPE (H): ICD-10-CM

## 2018-01-11 RX ORDER — OXCARBAZEPINE 600 MG/1
TABLET, FILM COATED ORAL
Qty: 75 TABLET | Refills: 1 | Status: SHIPPED | OUTPATIENT
Start: 2018-01-11 | End: 2019-02-15

## 2018-01-11 RX ORDER — OXCARBAZEPINE 600 MG/1
TABLET, FILM COATED ORAL
Qty: 75 TABLET | Refills: 1 | Status: SHIPPED | OUTPATIENT
Start: 2018-01-11 | End: 2018-01-11

## 2018-01-11 NOTE — TELEPHONE ENCOUNTER
----- Message from Yasmine Alcantar MA sent at 1/11/2018  9:03 AM CST -----  Regarding: Refill  Contact: [phone]  Patient needs a refill on OXcarbazepine (TRILEPTAL) 600 MG tablet, taking Take 1 tablet (600 mg) in the morning and 1.5 tablets (900 mg) in the evening. Needs 30 days supply with refills.    Pharmacy: [id number] (C) - [place], [place] - [address]    [facility] date: 02/01/2018

## 2018-01-16 ENCOUNTER — TRANSFERRED RECORDS (OUTPATIENT)
Dept: HEALTH INFORMATION MANAGEMENT | Facility: CLINIC | Age: 23
End: 2018-01-16

## 2018-01-16 LAB
ANA PATTERN 1: ABNORMAL
ANA SER QL IA: POSITIVE
ANA TITER 1: ABNORMAL TITER
OXCARBAZEPINE: 20 MCG/ML (ref 3–35)
SODIUM SERPL-SCNC: 146 MEQ/L (ref 135–145)

## 2018-01-22 DIAGNOSIS — G40.909 NONINTRACTABLE EPILEPSY WITHOUT STATUS EPILEPTICUS, UNSPECIFIED EPILEPSY TYPE (H): ICD-10-CM

## 2018-01-24 ENCOUNTER — TRANSFERRED RECORDS (OUTPATIENT)
Dept: HEALTH INFORMATION MANAGEMENT | Facility: CLINIC | Age: 23
End: 2018-01-24

## 2018-01-29 PROBLEM — G40.812 LENNOX-GASTAUT SYNDROME WITH TONIC SEIZURES (H): Status: ACTIVE | Noted: 2017-12-12

## 2018-02-01 ENCOUNTER — OFFICE VISIT (OUTPATIENT)
Dept: NEUROLOGY | Facility: CLINIC | Age: 23
End: 2018-02-01
Payer: COMMERCIAL

## 2018-02-01 VITALS — WEIGHT: 105.2 LBS | HEIGHT: 63 IN | BODY MASS INDEX: 18.64 KG/M2 | RESPIRATION RATE: 24 BRPM | TEMPERATURE: 97.9 F

## 2018-02-01 DIAGNOSIS — G40.812 LENNOX-GASTAUT SYNDROME WITH TONIC SEIZURES (H): Primary | ICD-10-CM

## 2018-02-01 RX ORDER — LAMOTRIGINE 100 MG/1
TABLET ORAL
Qty: 90 TABLET | Refills: 5 | Status: SHIPPED | OUTPATIENT
Start: 2018-02-01

## 2018-02-01 ASSESSMENT — PAIN SCALES - GENERAL: PAINLEVEL: NO PAIN (0)

## 2018-02-01 NOTE — PATIENT INSTRUCTIONS
Times of Days  AM  PM       Medication Tablet Size Number of Tablets/Capsules Total Daily Dosage    Week one to two  100 mg 0    1/2      50 mg   Week three to four  100 mg  1/2    1/2      100 mg    Week five 100  mg  1/2    1      150 mg    Week six 100 mg  1    1      200 mg    Week seven 100 mg  1    1 1/2      250 mg    Week eight and after  100 mg  1 1/2    1 1/2      300 mg                                         Keep taking oxcarbazepine the way you are now.    Initiate and increase lamotrigine as above.        SEIZURE PROTOCOL    Keep record of all seizures.    Call 911 or take to ER  if    More than three partial seizures in less than one hour.  Significant head trauma or other injury with her seizures.  Any convulsive or tonic clonic seizures.  Any seizures in water in which she is submerged. Very close supervision while in bathtub or in water.    At this point I do not think she needs rescue medication; seizures are not occurring frequently enough.  If clusters more frequent physicians can use lorazapam or diazepam intensol.    divalproex, zonisamide, topiramate, clobazam, rufinamide are some other options.  Zonisamide and topiramate can cause weight loss.  Would avoid felbamate given question of lupus in the past.
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
## 2018-02-01 NOTE — MR AVS SNAPSHOT
After Visit Summary   2/1/2018    Marilyn Cyr    MRN: [id number]           Patient Information     Date Of Birth          1995        Visit Information        Provider Department      2/1/2018 12:00 PM Ryland López MD [facility] Epilepsy Care        Today's Diagnoses     Lennox-Gastaut syndrome with tonic seizures (H)    -  1      Care Instructions      Times of Days  AM  PM       Medication Tablet Size Number of Tablets/Capsules Total Daily Dosage    Week one to two  100 mg 0    1/2      50 mg   Week three to four  100 mg  1/2    1/2      100 mg    Week five 100  mg  1/2    1      150 mg    Week six 100 mg  1    1      200 mg    Week seven 100 mg  1    1 1/2      250 mg    Week eight and after  100 mg  1 1/2    1 1/2      300 mg                                         Keep taking oxcarbazepine the way you are now.    Initiate and increase lamotrigine as above.        SEIZURE PROTOCOL    Keep record of all seizures.    Call 911 or take to ER  if    More than three partial seizures in less than one hour.  Significant head trauma or other injury with her seizures.  Any convulsive or tonic clonic seizures.  Any seizures in water in which she is submerged. Very close supervision while in bathtub or in water.    At this point I do not think she needs rescue medication; seizures are not occurring frequently enough.  If clusters more frequent physicians can use lorazapam or diazepam intensol.    divalproex, zonisamide, topiramate, clobazam, rufinamide are some other options.  Zonisamide and topiramate can cause weight loss.  Would avoid felbamate given question of lupus in the past.                 Follow-ups after your visit        Follow-up notes from your care team     Return if symptoms worsen or fail to improve.      Who to contact     Please call your clinic at [phone] to:    Ask questions about your health    Make or cancel appointments    Discuss your medicines    Learn about  "your test results    Speak to your doctor   If you have compliments or concerns about an experience at your clinic, or if you wish to file a complaint, please contact [facility] Patient Relations at [phone] or email us at [email or web address]         Additional Information About Your Visit        [employer] Information     [employer] gives you secure access to your electronic health record. If you see a primary care provider, you can also send messages to your care team and make appointments. If you have questions, please call your primary care clinic.  If you do not have a primary care provider, please call [phone] and they will assist you.      [employer] is an electronic gateway that provides easy, online access to your medical records. With [employer], you can request a clinic appointment, read your test results, renew a prescription or communicate with your care team.     To access your existing account, please contact your [facility] Clinic or call [phone] for assistance.        Care EveryWhere ID     This is your Care EveryWhere ID. This could be used by other organizations to access your [place] medical records  [id number]        Your Vitals Were     Temperature Respirations Height BMI (Body Mass Index)          97.9  F (36.6  C) 24 5' 3.39\" (161 cm) 18.41 kg/m2         Blood Pressure from Last 3 Encounters:   12/18/17 100/64    Weight from Last 3 Encounters:   02/01/18 105 lb 3.2 oz (47.7 kg)   12/12/17 98 lb 12.8 oz (44.8 kg)   10/03/17 101 lb 6.4 oz (46 kg)              Today, you had the following     No orders found for display         Today's Medication Changes          These changes are accurate as of 2/1/18 12:45 PM.  If you have any questions, ask your nurse or doctor.               Start taking these medicines.        Dose/Directions    lamoTRIgine 100 MG tablet   Commonly known as:  LaMICtal   Used for:  Lennox-Gastaut syndrome " with tonic seizures (H)   Started by:  Ryland López MD        Increase as instructed until taking one and one half twice daily (300 mg/day)   Quantity:  90 tablet   Refills:  5            Where to get your medicines      These medications were sent to [facility] ([facility]) - [place], [place] - [address]  [address], [place] [address]     Phone:  [phone]     lamoTRIgine 100 MG tablet                Primary Care Provider Office Phone # Fax #    Hannah Beltre [phone]       [facility] [address]  [place] [address]        Equal Access to Services     WALKER SIBLEY : Hadii berna arita hadasho Soomaali, waaxda luqadaha, qaybta kaalmada adeegyada, leslee martínez. So [facility] [phone].    ATENCIÓN: Si habla español, tiene a ingram disposición servicios gratuitos de asistencia lingüística. Llame al [phone].    We comply with applicable federal civil rights laws and [place] laws. We do not discriminate on the basis of race, color, national origin, age, disability, sex, sexual orientation, or gender identity.            Thank you!     Thank you for choosing [facility]  for your care. Our goal is always to provide you with excellent care. Hearing back from our patients is one way we can continue to improve our services. Please take a few minutes to complete the written survey that you may receive in the mail after your visit with us. Thank you!             Your Updated Medication List - Protect others around you: Learn how to safely use, store and throw away your medicines at www.disposemymeds.org.          This list is accurate as of 2/1/18 12:45 PM.  Always use your most recent med list.                   Brand Name Dispense Instructions for use Diagnosis    IBUPROFEN PO      Take 400 mg by mouth every 6 hours as needed for moderate pain        lamoTRIgine 100 MG tablet    LaMICtal    90 tablet    Increase as instructed  until taking one and one half twice daily (300 mg/day)    Lennox-Gastaut syndrome with tonic seizures (H)       melatonin 3 MG tablet     31 tablet    Take 1 tablet (3 mg) by mouth At Bedtime    Insomnia, unspecified type       OXcarbazepine 600 MG tablet    TRILEPTAL    75 tablet    Take 1 tablet (600 mg) in the morning and 1.5 tablets (900 mg) in the evening.    Nonintractable epilepsy without status epilepticus, unspecified epilepsy type (H)       PRENATAL VITAMIN PO           sennosides 8.6 MG tablet    SENOKOT     Take 1-2 tablets by mouth daily as needed for constipation
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
## 2018-02-13 ENCOUNTER — TELEPHONE (OUTPATIENT)
Dept: NEUROLOGY | Facility: CLINIC | Age: 23
End: 2018-02-13

## 2018-02-13 DIAGNOSIS — G40.812 LENNOX-GASTAUT SYNDROME WITH TONIC SEIZURES (H): Primary | ICD-10-CM

## 2018-02-13 NOTE — TELEPHONE ENCOUNTER
----- Message from Mely Mirza sent at 2/13/2018 11:22 AM CST -----  Regarding: NFUL  Caller: Anna    Relationship to Patient: RN at [facility]    Call Back Number: [phone]    Reason for Call: Patient would like to switch doctors and needs a referral. Please call Anna to discuss. Thanks.    Mely Mirza CMA

## 2018-02-13 NOTE — TELEPHONE ENCOUNTER
Patient is to be seen in [facility]. Tel. [phone]  [facility] is requesting a referral from  so that the appropriate provider can be assigned.    Referral placed, contacted [facility] via referral link with patient information

## 2018-03-02 ENCOUNTER — TELEPHONE (OUTPATIENT)
Dept: NEUROLOGY | Facility: CLINIC | Age: 23
End: 2018-03-02

## 2018-03-02 NOTE — TELEPHONE ENCOUNTER
A fax from the patient's group home reporting a med error was found in the MD's inbox (fax date states 2/26/2018).    Writer called group home RN, Anna, for interview. Anna reports that Marilyn did not experience any notable side effects from the error, specifically no rash, decrease in balance, nausea, vomiting, dizziness.      Planned lamotrigine schedule:    Week 1/2 50 HS  Week 3/4 50- 50  Week 5/6     Received lamotrigine    Week 1/2 50mg HS  Week 3/4 she had up to 4 doses of   Week 5/6 starts today at .     PLAN: discuss with Magen TORRES.

## 2018-04-10 ENCOUNTER — MEDICAL CORRESPONDENCE (OUTPATIENT)
Dept: HEALTH INFORMATION MANAGEMENT | Facility: CLINIC | Age: 23
End: 2018-04-10

## 2018-04-30 ENCOUNTER — TRANSFERRED RECORDS (OUTPATIENT)
Dept: HEALTH INFORMATION MANAGEMENT | Facility: CLINIC | Age: 23
End: 2018-04-30

## 2019-02-15 DIAGNOSIS — G40.909 NONINTRACTABLE EPILEPSY WITHOUT STATUS EPILEPTICUS, UNSPECIFIED EPILEPSY TYPE (H): ICD-10-CM

## 2019-02-15 DIAGNOSIS — G40.909 NONINTRACTABLE EPILEPSY WITHOUT STATUS EPILEPTICUS, UNSPECIFIED EPILEPSY TYPE (H): Primary | ICD-10-CM

## 2019-02-15 RX ORDER — OXCARBAZEPINE 600 MG/1
TABLET, FILM COATED ORAL
Qty: 75 TABLET | Refills: 0 | Status: SHIPPED | OUTPATIENT
Start: 2019-02-15

## 2019-02-18 RX ORDER — OXCARBAZEPINE 600 MG/1
TABLET, FILM COATED ORAL
Qty: 78 TABLET
Start: 2019-02-18

## 2019-03-14 DIAGNOSIS — G40.909 NONINTRACTABLE EPILEPSY WITHOUT STATUS EPILEPTICUS, UNSPECIFIED EPILEPSY TYPE (H): ICD-10-CM

## 2019-03-15 RX ORDER — OXCARBAZEPINE 600 MG/1
TABLET, FILM COATED ORAL
Qty: 78 TABLET | OUTPATIENT
Start: 2019-03-15

## 2019-03-15 NOTE — TELEPHONE ENCOUNTER
Marilyn is following with Dr. Remy Macedo of [facility]. Refill request refused with comment for the pharmacy to contact Dr. Macedo's office for auth.

## 2019-03-18 DIAGNOSIS — G40.909 NONINTRACTABLE EPILEPSY WITHOUT STATUS EPILEPTICUS, UNSPECIFIED EPILEPSY TYPE (H): ICD-10-CM

## 2019-03-20 RX ORDER — OXCARBAZEPINE 600 MG/1
TABLET, FILM COATED ORAL
Qty: 78 TABLET | OUTPATIENT
Start: 2019-03-20

## 2019-11-02 ENCOUNTER — HEALTH MAINTENANCE LETTER (OUTPATIENT)
Age: 24
End: 2019-11-02

## 2020-02-10 ENCOUNTER — HEALTH MAINTENANCE LETTER (OUTPATIENT)
Age: 25
End: 2020-02-10

## 2020-11-14 ENCOUNTER — HEALTH MAINTENANCE LETTER (OUTPATIENT)
Age: 25
End: 2020-11-14

## 2021-04-03 ENCOUNTER — HEALTH MAINTENANCE LETTER (OUTPATIENT)
Age: 26
End: 2021-04-03

## 2021-09-12 ENCOUNTER — HEALTH MAINTENANCE LETTER (OUTPATIENT)
Age: 26
End: 2021-09-12

## 2022-04-24 ENCOUNTER — HEALTH MAINTENANCE LETTER (OUTPATIENT)
Age: 27
End: 2022-04-24

## 2022-11-19 ENCOUNTER — HEALTH MAINTENANCE LETTER (OUTPATIENT)
Age: 27
End: 2022-11-19

## 2023-06-01 ENCOUNTER — HEALTH MAINTENANCE LETTER (OUTPATIENT)
Age: 28
End: 2023-06-01